# Patient Record
Sex: FEMALE | Race: WHITE | URBAN - METROPOLITAN AREA
[De-identification: names, ages, dates, MRNs, and addresses within clinical notes are randomized per-mention and may not be internally consistent; named-entity substitution may affect disease eponyms.]

---

## 2024-06-05 ENCOUNTER — OFFICE VISIT (OUTPATIENT)
Dept: INTEGRATIVE MEDICINE | Facility: CLINIC | Age: 39
End: 2024-06-05
Payer: COMMERCIAL

## 2024-06-05 VITALS
OXYGEN SATURATION: 98 % | WEIGHT: 190.38 LBS | HEIGHT: 64 IN | DIASTOLIC BLOOD PRESSURE: 70 MMHG | BODY MASS INDEX: 32.5 KG/M2 | HEART RATE: 70 BPM | SYSTOLIC BLOOD PRESSURE: 110 MMHG

## 2024-06-05 DIAGNOSIS — R53.83 OTHER FATIGUE: Primary | ICD-10-CM

## 2024-06-05 DIAGNOSIS — N92.0 MENORRHAGIA WITH REGULAR CYCLE: ICD-10-CM

## 2024-06-05 DIAGNOSIS — R63.5 WEIGHT GAIN: ICD-10-CM

## 2024-06-05 PROCEDURE — 3078F DIAST BP <80 MM HG: CPT | Performed by: PHYSICIAN ASSISTANT

## 2024-06-05 PROCEDURE — G2211 COMPLEX E/M VISIT ADD ON: HCPCS | Performed by: PHYSICIAN ASSISTANT

## 2024-06-05 PROCEDURE — 99204 OFFICE O/P NEW MOD 45 MIN: CPT | Performed by: PHYSICIAN ASSISTANT

## 2024-06-05 PROCEDURE — 3074F SYST BP LT 130 MM HG: CPT | Performed by: PHYSICIAN ASSISTANT

## 2024-06-05 PROCEDURE — 3008F BODY MASS INDEX DOCD: CPT | Performed by: PHYSICIAN ASSISTANT

## 2024-06-05 RX ORDER — FLUOXETINE 10 MG/1
10 CAPSULE ORAL DAILY
COMMUNITY
Start: 2024-01-16

## 2024-06-05 RX ORDER — LORAZEPAM 0.5 MG/1
0.5 TABLET ORAL 2 TIMES DAILY PRN
COMMUNITY
Start: 2024-03-05

## 2024-06-05 NOTE — PROGRESS NOTES
Diana Kapadia is a 38 year old female.  Chief Complaint   Patient presents with   • Establish Care     Patient presents to establish care. Post partum health.        HPI:   Diana presents for initial evaluation,     Main concern:   She is 9 months post partum. She is feeling like her health could be better. She is struggling to lose weight. She has gotten back to exercise and nothing is coming off.   She is still feeding still.   She is feeling achier     Thyroid: NO known thyroid issues.     Body/rash:   Brain fog around cycle  She has achiness all the time. No achiness before she was pregnant. Yesterday she ran a mile and a half and she did not feel right.     Hormones - She was diagnosed with endometriosis 31 or 32.   She feels like she has brain fog especially around her cycle.   She has a history of endometriosis. They discovered it when she was looking into fertility preservation and found endometrial     Painful periods. She feels this was an issue as long as she can remember    She started her period at age 9   She started birth control in college. She went off it in her 20s. BC affected her mood.     She proactively went to acupuncture to help with fertility.   No miscarriages     She has regular cycle. It is about once a month it is less than 30 days     GI - She has regular bowel she will have 1 a day. Occasionally throughout the day.   She feels bloating. She feels this is something she has struggled with for a while     Mental state -   She has struggled with anxiety.   She was proactive post partum seeing a therapist.   She is more down the week before her cycle     Weight History:   She has felt like in the past.     Before puberty she was a little chubby she was never heavier.   She did not struggle with weight in high school and college.   She was always size 4-6 high school and college. She was very active.     Late 20s early 30s she felt like that was a concern that she could not lose  the weight in her stomach. She had a concern because her grandmother had a tumor that was ovarian cancer     She has only lost her daughters weight     Childhood -   Trauma: no childhood trauma, no other trauma   Antibiotic use  She was on antibiotics for ear infections. She had tubes twice. She started becoming immune to different antibiotics.         Lifestyle Factors affecting health:   Diet - She sometimes feels she is all over the place with eating.   Morning she will wake up and have a RX bar - clean protein bar     Mid morning - toast, egg, spinach    Lunch salad with chicken sometimes cottage cheese. She is eating whole foods throughout the day. She grazes more.     Diner - 530 turkey nachos on peppers   [place] with greens     After her daughter goes to sleep she will snack with peanuts, white chocolate chips and pretzels     Exercise -she has been going to a strength pilates once or twice a week.      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 She is trying to figure out her next career move. She feels her stress has manifested into anxiety     Sleep - Daughter does not sleep through the night. She will wake with her daughter once a night.   She can fall asleep and stay asleep     Supplements:   Was on a prenatal   ALLERGIES   No Known Allergies     CURRENT MEDICATIONS:     Current Outpatient Medications   Medication Sig Dispense Refill   • FLUoxetine 10 MG Oral Cap Take 1 capsule (10 mg total) by mouth daily.     • LORazepam 0.5 MG Oral Tab Take 1 tablet (0.5 mg total) by mouth 2 (two) times daily as needed for Anxiety.         MEDICAL HISTORY:     Past Medical History:   • Anxiety   • Endometriosis       SURGICAL HISTORY:   History reviewed. No pertinent surgical history.    FAMILY HISTORY:      Family History   Problem Relation Age of Onset   • Cancer Father    • Cancer Maternal Grandmother        SOCIAL HISTORY:     Social History     Tobacco Use   • Smoking status: Never   •  Smokeless tobacco: Never   Vaping Use   • Vaping status: Never Used   Substance and Sexual Activity   • Alcohol use: Yes   • Drug use: Never       REVIEW OF SYSTEMS:   Review of Systems     See HPI for pertinent positives and negatives     PHYSICAL EXAM:     Vitals:    06/05/24 1341   BP: 110/70   BP Location: Right arm   Patient Position: Sitting   Cuff Size: adult   Pulse: 70   SpO2: 98%   Weight: 190 lb 6.4 oz (86.4 kg)   Height: 5' 4\" (1.626 m)       Physical Exam     Physical Exam  Constitutional:       Appearance: Normal appearance.   Neurological:      General: No focal deficit present.      Mental Status: She is alert and oriented to person, place, and time.   Psychiatric:         Mood and Affect: Mood normal.    ASSESSMENT AND PLAN:     Full hormone testing will be evaluated based on labs recommendations will be made from a hormonal standpoint    I am concerned there is a leaky gut component. We will start with probiotic and possible digestive enzyme based on stomach acid test     Future discussion on pro inflammatory triggers    Discussed stress management techniques briefly     1. Other fatigue  - Free T3 (Triiodothryronine); Future  - Reverse T3, Serum; Future  - Thyroid Antithyroglobulin AB; Future  - Thyroid Peroxidase (TPO) AB; Future  - Free T4, (Free Thyroxine); Future  - Assay, Thyroid Stim Hormone; Future  - Comp Metabolic Panel (14); Future  - CBC With Differential With Platelet; Future  - Vitamin D; Future  - Dehydroepiandrosterone Sulfate; Future  - Estradiol; Future  - Estrone, Serum; Future  - FSH; Future  - LH Fertility; Future  - Testosterone,Total and Weakly Bound w/ SHBG; Future  - Progesterone; Future  - Insulin; Future  - Leptin, Serum; Future    2. Weight gain  - Free T3 (Triiodothryronine); Future  - Reverse T3, Serum; Future  - Thyroid Antithyroglobulin AB; Future  - Thyroid Peroxidase (TPO) AB; Future  - Free T4, (Free Thyroxine); Future  - Assay, Thyroid Stim Hormone; Future  -  Insulin; Future  - Leptin, Serum; Future    3. Menorrhagia with regular cycle  - Free T3 (Triiodothryronine); Future  - Reverse T3, Serum; Future  - Thyroid Antithyroglobulin AB; Future  - Thyroid Peroxidase (TPO) AB; Future  - Free T4, (Free Thyroxine); Future  - Assay, Thyroid Stim Hormone; Future  - Comp Metabolic Panel (14); Future  - CBC With Differential With Platelet; Future  - Vitamin D; Future  - Dehydroepiandrosterone Sulfate; Future  - Estradiol; Future  - Estrone, Serum; Future  - FSH; Future  - LH Fertility; Future  - Testosterone,Total and Weakly Bound w/ SHBG; Future  - Progesterone; Future  - Insulin; Future  - Leptin, Serum; Future      Time spent with patient: Over 45 minutes spent in chart review and in direct communication with patient obtaining and reviewing history, creating a unique care plan, explaining the rationale for treatment, reviewing potential SE and overall treatment plan,  documenting all clinical information in Epic. Over 50% of this time was in education, counseling and coordination of care.     Problem List Items Addressed This Visit    None  Visit Diagnoses       Other fatigue    -  Primary    Relevant Orders    Free T3 (Triiodothryronine)    Reverse T3, Serum    Thyroid Antithyroglobulin AB    Thyroid Peroxidase (TPO) AB    Free T4, (Free Thyroxine)    Assay, Thyroid Stim Hormone    Comp Metabolic Panel (14)    CBC With Differential With Platelet    Vitamin D    Dehydroepiandrosterone Sulfate    Estradiol    Estrone, Serum    FSH    LH Fertility    Testosterone,Total and Weakly Bound w/ SHBG    Progesterone    Insulin    Leptin, Serum    Weight gain        Relevant Orders    Free T3 (Triiodothryronine)    Reverse T3, Serum    Thyroid Antithyroglobulin AB    Thyroid Peroxidase (TPO) AB    Free T4, (Free Thyroxine)    Assay, Thyroid Stim Hormone    Insulin    Leptin, Serum    Menorrhagia with regular cycle        Relevant Orders    Free T3 (Triiodothryronine)    Reverse T3, Serum     Thyroid Antithyroglobulin AB    Thyroid Peroxidase (TPO) AB    Free T4, (Free Thyroxine)    Assay, Thyroid Stim Hormone    Comp Metabolic Panel (14)    CBC With Differential With Platelet    Vitamin D    Dehydroepiandrosterone Sulfate    Estradiol    Estrone, Serum    FSH    LH Fertility    Testosterone,Total and Weakly Bound w/ SHBG    Progesterone    Insulin    Leptin, Serum             Orders Placed This Visit:  Orders Placed This Encounter   Procedures   • Free T3 (Triiodothryronine)   • Reverse T3, Serum   • Thyroid Antithyroglobulin AB   • Thyroid Peroxidase (TPO) AB   • Free T4, (Free Thyroxine)   • Assay, Thyroid Stim Hormone   • Comp Metabolic Panel (14)   • CBC With Differential With Platelet   • Vitamin D   • Dehydroepiandrosterone Sulfate   • Estradiol   • Estrone, Serum   • FSH   • LH Fertility   • Testosterone,Total and Weakly Bound w/ SHBG   • Progesterone   • Insulin   • Leptin, Serum     No orders of the defined types were placed in this encounter.      Patient Instructions   Day one is first day of bleeding   Blood draw on day 19-21   Fasting blood draw     Stomach acid test    First thing in the morning.  1/4 tsp baking soda + 4 ounces of water.  You should burp within 3 minutes if you do not burp it means low stomach acid.     If you do not burp I would incorporate the below digestive enzyme     The following website can be utilized to purchase supplements.     [email or web address]           One capsule with every meal      Probiotic G.I. is a shelf-stable probiotic blend to support healthy immune function within the G.I. tract. Probiotic G.I. promotes gut associated lymphoid tissue composition and function to support healthy barrier function.*    Promotes G.I. health*  Helps maintain healthy cellular activity in the G.I. tract*  Made with hypoallergenic, vegan ingredients*  Shelf-stable probiotic blend to support a healthy immune function within the G.I.  tract*  Probiotic G.I. provides 10 billion CFU per capsule of the beneficial bacteria Lactobacillus acidophilus, Lactobacillus salivarius, Lactobacillus casei, Bifidobacterium bifidum, Bifidobacterium lactis and Streptococcus thermophilus. Research indicates that these strains provide particular support for healthy immune balance within the G.I. tract. Beneficial microflora are crucial for proper gut associated lymphoid tissue (GALT) function and development. The GALT helps protect intestinal mucosa from allergens and toxins and is a major component of the body.           Suggested Use:  Take 1 capsule, 1 times daily, with or between meals.       Serving Size: One Vegetarian Capsule       Amount Per Serving  Probiotic Blend ... 10 Billion CFU  Providing:  Bifidobacterium lactis (Bl-04)  Lactobacillus acidophilus (La-14)  Lactobacillus salivarius (Ls-33)  Lactobacillus casei (Lc-11)  Streptococcus thermophilus (St-21)  Bifidobacterium bifidum (Bb-06)       Other Ingredients: hypoallergenic plant fiber (cellulose), vegetarian capsule (cellulose, water).     Try to eat 3 meals a day and decrease snacking and grazing   Protein with three meals     Keep Net carbs low - Net Carb = Total carb - fiber   Net carb goal <30g per meal    Fruit Carbohydrates per serving Fiber per serving Net carbs   Grapes (1Cup/151g)  26g 0.8g 25.2g   Banana (1 medium) 24g 3.1g 21g   Pear (1 medium) 22g 6g 16g   Apple (1 medium) 21g 4.4g 16.6g         Pineapple (1Cup/165g) 29g 2.3g 26.7   Blueberries (1Cup/148g) 17g 4g 13g   Cantaloupe (1Cup/177g) 14g 1.6g 12.4g   Oranges (1 medium) 12g 2.3g 9.7g   Watermelon (1Cup/154g) 12g 0.6g 11.4g   Peaches (1 Large) 9.5g 3g 6.5   Kiwi (1 medium) 9g 2.1g 6.9   Avocado (half of one) 9.5g 4.6g 4.9g   Strawberries (1Cup/144g) 8g 3 5g   Lime (1 fruit) 7g 1.9g 5.1g   Lemon (1 fruit) 6g 1.6g 4.4g   Tomatoes (1 fruit) 3.2g  1.5g 1.7g   Vegetables Carbohydrates per serving  Fiber per serving  Net carbs per serving     Asparagus (5 vora/93.5g) 4g 1.5g 2.5g   Bell Pepper (1 medium) 6g 2g 4g   Broccoli (1 medium stalk) 8g 6g 2g   Carrot (1 carrot 7.5inches long) 7g 1.7g 5.3g   Cauliflower (99g)  5g 2g 3g   Celery (2 medium stalks) 4g 2g 2g   Cucumber (? medium) 2g 1g 1g   Green Beans (3/4C)  5g 3g 2g   Green Cabbage (84g) 5g 2g 3g   Green Onion (1/4C chopped) 2g 1g 1g   Iceberg lettuce (89g)  2g 1.1g 0.9g   Jalapeno (1C sliced 6g 2.5g 3.5g   Leaf lettuce (1/2C shredded) 2g 1g 1.g   Mushrooms (5 medium) 3g 1g 2g   Onion (1 medium) 11g 1.9g 9g   Potatoe (1 medium/148g) 26g 2g 24g   Radishes (7 radishes) 3g 0.7g 2.3g   Summer Squash (½ medium) 4g 1.1g 2.9g   Sweet corn (1 medium ear) 18g 2.4g 15.6   Sweet Potato (1 medium) 23g 4g 19g     Proteins I recommend    Powders   Tone it up  Isopure  Optimum nutrition     Pre made   Slim Fast     In the future we may look into decreasing pro inflammatory foods such as gluten        Return in about 6 weeks (around 7/17/2024) for 60.    Patient affirmed understanding of plan and all questions were answered.     Alissa Jeffrey PA-C

## 2024-06-05 NOTE — PATIENT INSTRUCTIONS
Day one is first day of bleeding   Blood draw on day 19-21   Fasting blood draw     Stomach acid test    First thing in the morning.  1/4 tsp baking soda + 4 ounces of water.  You should burp within 3 minutes if you do not burp it means low stomach acid.     If you do not burp I would incorporate the below digestive enzyme     The following website can be utilized to purchase supplements.     [email or web address]           One capsule with every meal      Probiotic G.I. is a shelf-stable probiotic blend to support healthy immune function within the G.I. tract. Probiotic G.I. promotes gut associated lymphoid tissue composition and function to support healthy barrier function.*    Promotes G.I. health*  Helps maintain healthy cellular activity in the G.I. tract*  Made with hypoallergenic, vegan ingredients*  Shelf-stable probiotic blend to support a healthy immune function within the G.I. tract*  Probiotic G.I. provides 10 billion CFU per capsule of the beneficial bacteria Lactobacillus acidophilus, Lactobacillus salivarius, Lactobacillus casei, Bifidobacterium bifidum, Bifidobacterium lactis and Streptococcus thermophilus. Research indicates that these strains provide particular support for healthy immune balance within the G.I. tract. Beneficial microflora are crucial for proper gut associated lymphoid tissue (GALT) function and development. The GALT helps protect intestinal mucosa from allergens and toxins and is a major component of the body.           Suggested Use:  Take 1 capsule, 1 times daily, with or between meals.       Serving Size: One Vegetarian Capsule       Amount Per Serving  Probiotic Blend ... 10 Billion CFU  Providing:  Bifidobacterium lactis (Bl-04)  Lactobacillus acidophilus (La-14)  Lactobacillus salivarius (Ls-33)  Lactobacillus casei (Lc-11)  Streptococcus thermophilus (St-21)  Bifidobacterium bifidum (Bb-06)       Other Ingredients: hypoallergenic plant fiber  (cellulose), vegetarian capsule (cellulose, water).     Try to eat 3 meals a day and decrease snacking and grazing   Protein with three meals     Keep Net carbs low - Net Carb = Total carb - fiber   Net carb goal <30g per meal    Fruit Carbohydrates per serving Fiber per serving Net carbs   Grapes (1Cup/151g)  26g 0.8g 25.2g   Banana (1 medium) 24g 3.1g 21g   Pear (1 medium) 22g 6g 16g   Apple (1 medium) 21g 4.4g 16.6g         Pineapple (1Cup/165g) 29g 2.3g 26.7   Blueberries (1Cup/148g) 17g 4g 13g   Cantaloupe (1Cup/177g) 14g 1.6g 12.4g   Oranges (1 medium) 12g 2.3g 9.7g   Watermelon (1Cup/154g) 12g 0.6g 11.4g   Peaches (1 Large) 9.5g 3g 6.5   Kiwi (1 medium) 9g 2.1g 6.9   Avocado (half of one) 9.5g 4.6g 4.9g   Strawberries (1Cup/144g) 8g 3 5g   Lime (1 fruit) 7g 1.9g 5.1g   Lemon (1 fruit) 6g 1.6g 4.4g   Tomatoes (1 fruit) 3.2g  1.5g 1.7g   Vegetables Carbohydrates per serving  Fiber per serving  Net carbs per serving    Asparagus (5 vora/93.5g) 4g 1.5g 2.5g   Bell Pepper (1 medium) 6g 2g 4g   Broccoli (1 medium stalk) 8g 6g 2g   Carrot (1 carrot 7.5inches long) 7g 1.7g 5.3g   Cauliflower (99g)  5g 2g 3g   Celery (2 medium stalks) 4g 2g 2g   Cucumber (? medium) 2g 1g 1g   Green Beans (3/4C)  5g 3g 2g   Green Cabbage (84g) 5g 2g 3g   Green Onion (1/4C chopped) 2g 1g 1g   Iceberg lettuce (89g)  2g 1.1g 0.9g   Jalapeno (1C sliced 6g 2.5g 3.5g   Leaf lettuce (1/2C shredded) 2g 1g 1.g   Mushrooms (5 medium) 3g 1g 2g   Onion (1 medium) 11g 1.9g 9g   Potatoe (1 medium/148g) 26g 2g 24g   Radishes (7 radishes) 3g 0.7g 2.3g   Summer Squash (½ medium) 4g 1.1g 2.9g   Sweet corn (1 medium ear) 18g 2.4g 15.6   Sweet Potato (1 medium) 23g 4g 19g     Proteins I recommend    Powders   Tone it up  Isopure  Optimum nutrition     Pre made   Slim Fast     In the future we may look into decreasing pro inflammatory foods such as gluten

## 2024-06-25 ENCOUNTER — LAB ENCOUNTER (OUTPATIENT)
Dept: LAB | Age: 39
End: 2024-06-25
Attending: PHYSICIAN ASSISTANT

## 2024-06-25 DIAGNOSIS — N92.0 MENORRHAGIA WITH REGULAR CYCLE: ICD-10-CM

## 2024-06-25 DIAGNOSIS — R63.5 WEIGHT GAIN: ICD-10-CM

## 2024-06-25 DIAGNOSIS — R53.83 OTHER FATIGUE: ICD-10-CM

## 2024-06-25 LAB
ALBUMIN SERPL-MCNC: 4.2 G/DL (ref 3.2–4.8)
ALBUMIN/GLOB SERPL: 1.6 {RATIO} (ref 1–2)
ALP LIVER SERPL-CCNC: 95 U/L
ALT SERPL-CCNC: 19 U/L
ANION GAP SERPL CALC-SCNC: 8 MMOL/L (ref 0–18)
AST SERPL-CCNC: 17 U/L (ref ?–34)
BASOPHILS # BLD AUTO: 0.07 X10(3) UL (ref 0–0.2)
BASOPHILS NFR BLD AUTO: 0.9 %
BILIRUB SERPL-MCNC: 0.3 MG/DL (ref 0.3–1.2)
BUN BLD-MCNC: 14 MG/DL (ref 9–23)
BUN/CREAT SERPL: 23 (ref 10–20)
CALCIUM BLD-MCNC: 9.1 MG/DL (ref 8.7–10.4)
CHLORIDE SERPL-SCNC: 111 MMOL/L (ref 98–112)
CO2 SERPL-SCNC: 20 MMOL/L (ref 21–32)
CREAT BLD-MCNC: 0.61 MG/DL
DEPRECATED RDW RBC AUTO: 40.6 FL (ref 35.1–46.3)
DHEA-S SERPL-MCNC: 73 UG/DL
EGFRCR SERPLBLD CKD-EPI 2021: 117 ML/MIN/1.73M2 (ref 60–?)
EOSINOPHIL # BLD AUTO: 0.13 X10(3) UL (ref 0–0.7)
EOSINOPHIL NFR BLD AUTO: 1.7 %
ERYTHROCYTE [DISTWIDTH] IN BLOOD BY AUTOMATED COUNT: 13.2 % (ref 11–15)
ESTRADIOL SERPL-MCNC: 100.3 PG/ML
FASTING STATUS PATIENT QL REPORTED: YES
FSH SERPL-ACNC: 3.9 MIU/ML
GLOBULIN PLAS-MCNC: 2.7 G/DL (ref 2–3.5)
GLUCOSE BLD-MCNC: 104 MG/DL (ref 70–99)
HCT VFR BLD AUTO: 40.1 %
HGB BLD-MCNC: 13.7 G/DL
IMM GRANULOCYTES # BLD AUTO: 0.02 X10(3) UL (ref 0–1)
IMM GRANULOCYTES NFR BLD: 0.3 %
INSULIN SERPL-ACNC: 7.4 MU/L (ref 3–25)
LH SERPL-ACNC: 5.3 MIU/ML
LYMPHOCYTES # BLD AUTO: 2.48 X10(3) UL (ref 1–4)
LYMPHOCYTES NFR BLD AUTO: 33.2 %
MCH RBC QN AUTO: 28.8 PG (ref 26–34)
MCHC RBC AUTO-ENTMCNC: 34.2 G/DL (ref 31–37)
MCV RBC AUTO: 84.2 FL
MONOCYTES # BLD AUTO: 0.46 X10(3) UL (ref 0.1–1)
MONOCYTES NFR BLD AUTO: 6.1 %
NEUTROPHILS # BLD AUTO: 4.32 X10 (3) UL (ref 1.5–7.7)
NEUTROPHILS # BLD AUTO: 4.32 X10(3) UL (ref 1.5–7.7)
NEUTROPHILS NFR BLD AUTO: 57.8 %
OSMOLALITY SERPL CALC.SUM OF ELEC: 289 MOSM/KG (ref 275–295)
PLATELET # BLD AUTO: 273 10(3)UL (ref 150–450)
POTASSIUM SERPL-SCNC: 4.3 MMOL/L (ref 3.5–5.1)
PROGEST SERPL-MCNC: 13.18 NG/ML
PROT SERPL-MCNC: 6.9 G/DL (ref 5.7–8.2)
RBC # BLD AUTO: 4.76 X10(6)UL
SODIUM SERPL-SCNC: 139 MMOL/L (ref 136–145)
T3FREE SERPL-MCNC: 2.91 PG/ML (ref 2.4–4.2)
T4 FREE SERPL-MCNC: 0.8 NG/DL (ref 0.8–1.7)
THYROGLOB SERPL-MCNC: <15 U/ML (ref ?–60)
THYROPEROXIDASE AB SERPL-ACNC: <28 U/ML (ref ?–60)
TSI SER-ACNC: 0.97 MIU/ML (ref 0.55–4.78)
VIT D+METAB SERPL-MCNC: 26.6 NG/ML (ref 30–100)
WBC # BLD AUTO: 7.5 X10(3) UL (ref 4–11)

## 2024-06-25 PROCEDURE — 83520 IMMUNOASSAY QUANT NOS NONAB: CPT | Performed by: PHYSICIAN ASSISTANT

## 2024-06-25 PROCEDURE — 84481 FREE ASSAY (FT-3): CPT | Performed by: PHYSICIAN ASSISTANT

## 2024-06-25 PROCEDURE — 80050 GENERAL HEALTH PANEL: CPT | Performed by: PHYSICIAN ASSISTANT

## 2024-06-25 PROCEDURE — 83525 ASSAY OF INSULIN: CPT | Performed by: PHYSICIAN ASSISTANT

## 2024-06-25 PROCEDURE — 83001 ASSAY OF GONADOTROPIN (FSH): CPT | Performed by: PHYSICIAN ASSISTANT

## 2024-06-25 PROCEDURE — 84439 ASSAY OF FREE THYROXINE: CPT | Performed by: PHYSICIAN ASSISTANT

## 2024-06-25 PROCEDURE — 82670 ASSAY OF TOTAL ESTRADIOL: CPT | Performed by: PHYSICIAN ASSISTANT

## 2024-06-25 PROCEDURE — 84482 T3 REVERSE: CPT | Performed by: PHYSICIAN ASSISTANT

## 2024-06-25 PROCEDURE — 86800 THYROGLOBULIN ANTIBODY: CPT | Performed by: PHYSICIAN ASSISTANT

## 2024-06-25 PROCEDURE — 84410 TESTOSTERONE BIOAVAILABLE: CPT | Performed by: PHYSICIAN ASSISTANT

## 2024-06-25 PROCEDURE — 86376 MICROSOMAL ANTIBODY EACH: CPT | Performed by: PHYSICIAN ASSISTANT

## 2024-06-25 PROCEDURE — 82679 ASSAY OF ESTRONE: CPT | Performed by: PHYSICIAN ASSISTANT

## 2024-06-25 PROCEDURE — 82306 VITAMIN D 25 HYDROXY: CPT | Performed by: PHYSICIAN ASSISTANT

## 2024-06-25 PROCEDURE — 83002 ASSAY OF GONADOTROPIN (LH): CPT | Performed by: PHYSICIAN ASSISTANT

## 2024-06-25 PROCEDURE — 82627 DEHYDROEPIANDROSTERONE: CPT | Performed by: PHYSICIAN ASSISTANT

## 2024-06-25 PROCEDURE — 84144 ASSAY OF PROGESTERONE: CPT | Performed by: PHYSICIAN ASSISTANT

## 2024-06-27 LAB
ESTRONE: 56 PG/ML
LEPTIN: 56.1 NG/ML

## 2024-06-28 LAB
REVERSE T3: 8.6 NG/DL
SEX HORM BIND GLOB: 39.7 NMOL/L
TESTOST % FREE+WEAK BND: 13.7 %
TESTOST FREE+WEAK BND: 1.6 NG/DL
TESTOSTERONE TOT /MS: 11.4 NG/DL

## 2024-07-17 ENCOUNTER — OFFICE VISIT (OUTPATIENT)
Dept: INTEGRATIVE MEDICINE | Facility: CLINIC | Age: 39
End: 2024-07-17
Payer: COMMERCIAL

## 2024-07-17 VITALS
OXYGEN SATURATION: 97 % | HEART RATE: 57 BPM | BODY MASS INDEX: 32.89 KG/M2 | DIASTOLIC BLOOD PRESSURE: 70 MMHG | HEIGHT: 64 IN | SYSTOLIC BLOOD PRESSURE: 100 MMHG | WEIGHT: 192.63 LBS

## 2024-07-17 DIAGNOSIS — R06.83 SNORING: ICD-10-CM

## 2024-07-17 DIAGNOSIS — K21.9 GASTROESOPHAGEAL REFLUX DISEASE, UNSPECIFIED WHETHER ESOPHAGITIS PRESENT: ICD-10-CM

## 2024-07-17 DIAGNOSIS — R79.89 LOW VITAMIN D LEVEL: ICD-10-CM

## 2024-07-17 DIAGNOSIS — R53.83 OTHER FATIGUE: ICD-10-CM

## 2024-07-17 DIAGNOSIS — R63.5 WEIGHT GAIN: ICD-10-CM

## 2024-07-17 DIAGNOSIS — R79.89 ABNORMAL THYROID BLOOD TEST: ICD-10-CM

## 2024-07-17 DIAGNOSIS — N92.0 MENORRHAGIA WITH REGULAR CYCLE: Primary | ICD-10-CM

## 2024-07-17 PROCEDURE — 3074F SYST BP LT 130 MM HG: CPT | Performed by: PHYSICIAN ASSISTANT

## 2024-07-17 PROCEDURE — 3078F DIAST BP <80 MM HG: CPT | Performed by: PHYSICIAN ASSISTANT

## 2024-07-17 PROCEDURE — 99215 OFFICE O/P EST HI 40 MIN: CPT | Performed by: PHYSICIAN ASSISTANT

## 2024-07-17 PROCEDURE — 3008F BODY MASS INDEX DOCD: CPT | Performed by: PHYSICIAN ASSISTANT

## 2024-07-17 PROCEDURE — G2211 COMPLEX E/M VISIT ADD ON: HCPCS | Performed by: PHYSICIAN ASSISTANT

## 2024-07-17 RX ORDER — FLUOXETINE HYDROCHLORIDE 20 MG/1
20 CAPSULE ORAL DAILY
COMMUNITY
Start: 2024-06-17

## 2024-07-17 NOTE — PATIENT INSTRUCTIONS
Continue high protein  Limit anything processed  Stick to whole foods   Be kind to yourself and make small manageable lifestyle changes      Get fasting blood work prior to next appointment     Supplement recommendations:  Vitamin K2 with D3 (60 capsules) ([employer]): Please take  1 capsule, once / day.   Ortho Biotic (60 capsules) ([employer]): Please take  1 capsule, once / day.   PMS Support (60 capsules) ([employer]): Please take  2 capsules, once / day.   Pylori-X (120 capsules) ([employer]): Please take  2 capsules, twice / day (with bigger meals )    You can restart prenatal     Primary Care Providers     Roopa Oliva NP  [address]   [place], [place]  [phone]    Dr. Vu   [address]   [place]  [phone]    Dr. Miya Gorman   [address] [place] [address] [address]   [place], [place]    
12-Jan-2022

## 2024-07-17 NOTE — PROGRESS NOTES
Diana Kapadia is a 39 year old female.  Chief Complaint   Patient presents with    Follow - Up       HPI:   Diana presents for follow up,     Updates from last visit:   Since we spoke last she has been more mindful of protein intake. She does feel a little better. The number on the scale is not showing that.     Thyroid: sub optimal     Mental State:   She is less irritable when she is able to get a work out in before her daughter is up.   She feels more anxious when she is foggy.     Hormones -   Brain fog very much there.     GI -   She has been paying attention to her bowels. She is not regular. She is having bowels daily. But sometimes it is late others it is early. She has a sense of urgency. She is having soft stools daily .  She is waking up with her chest discomfort and she is snoring more. She is a lot more congested and itchy     Weight - no changes in weight       Lifestyle Factors affecting health:   Diet - Incorporating more protein     Exercise -  She is doing strength training. She goes to a class that is barre, pilates and yoga. She feels exercise helps her achy and more clear headed.      Stress - stress is the same \. She is trying to see if she should go back to work full time. Parenting is feeling a lot  better     Sleep - She is getting more full nights of sleep. She is still waking up super tired.     Supplements:   She got the isopure protein. She has been having protein shake 4 times a week. The probiotic she has not done yet.       HPI's FROM PREVIOUS VISITS      Diana presents for initial evaluation,     Main concern:   She is 9 months post partum. She is feeling like her health could be better. She is struggling to lose weight. She has gotten back to exercise and nothing is coming off.   She is still feeding still.   She is feeling achier     Thyroid: NO known thyroid issues.     Body/rash:   Brain fog around cycle  She has achiness all the time. No achiness before she was  pregnant. Yesterday she ran a mile and a half and she did not feel right.     Hormones - She was diagnosed with endometriosis 31 or 32.   She feels like she has brain fog especially around her cycle.   She has a history of endometriosis. They discovered it when she was looking into fertility preservation and found endometrial     Painful periods. She feels this was an issue as long as she can remember    She started her period at age 9   She started birth control in college. She went off it in her 20s. BC affected her mood.     She proactively went to acupuncture to help with fertility.   No miscarriages     She has regular cycle. It is about once a month it is less than 30 days     GI - She has regular bowel she will have 1 a day. Occasionally throughout the day.   She feels bloating. She feels this is something she has struggled with for a while     Mental state -   She has struggled with anxiety.   She was proactive post partum seeing a therapist.   She is more down the week before her cycle     Weight History:   She has felt like in the past.     Before puberty she was a little chubby she was never heavier.   She did not struggle with weight in high school and college.   She was always size 4-6 high school and college. She was very active.     Late 20s early 30s she felt like that was a concern that she could not lose the weight in her stomach. She had a concern because her grandmother had a tumor that was ovarian cancer     She has only lost her daughters weight     Childhood -   Trauma: no childhood trauma, no other trauma   Antibiotic use  She was on antibiotics for ear infections. She had tubes twice. She started becoming immune to different antibiotics.         Lifestyle Factors affecting health:   Diet - She sometimes feels she is all over the place with eating.   Morning she will wake up and have a RX bar - clean protein bar     Mid morning - toast, egg, spinach    Lunch salad with chicken sometimes  cottage cheese. She is eating whole foods throughout the day. She grazes more.     Diner - 530 turkey nachos on peppers   [place] with greens     After her daughter goes to sleep she will snack with peanuts, white chocolate chips and pretzels     Exercise -she has been going to a [facility] [employer] once or twice a week.      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 She is trying to figure out her next career move. She feels her stress has manifested into anxiety     Sleep - Daughter does not sleep through the night. She will wake with her daughter once a night.   She can fall asleep and stay asleep     Supplements:   Was on a prenatal   ALLERGIES   No Known Allergies     CURRENT MEDICATIONS:     Current Outpatient Medications   Medication Sig Dispense Refill    FLUoxetine 20 MG Oral Cap Take 1 capsule (20 mg total) by mouth daily.      LORazepam 0.5 MG Oral Tab Take 1 tablet (0.5 mg total) by mouth 2 (two) times daily as needed for Anxiety.      FLUoxetine 10 MG Oral Cap Take 1 capsule (10 mg total) by mouth daily. (Patient not taking: Reported on 7/17/2024)         MEDICAL HISTORY:     Past Medical History:    Anxiety    Endometriosis       SURGICAL HISTORY:   History reviewed. No pertinent surgical history.    FAMILY HISTORY:      Family History   Problem Relation Age of Onset    Cancer Father     Cancer Maternal Grandmother        SOCIAL HISTORY:     Social History     Socioeconomic History    Marital status: Single   Tobacco Use    Smoking status: Never    Smokeless tobacco: Never   Vaping Use    Vaping status: Never Used   Substance and Sexual Activity    Alcohol use: Yes    Drug use: Never       REVIEW OF SYSTEMS:   Review of Systems     See HPI for pertinent positives and negatives     PHYSICAL EXAM:     Vitals:    07/17/24 1057   BP: 100/70   BP Location: Right arm   Patient Position: Sitting   Cuff Size: adult   Pulse: 57   SpO2: 97%   Weight: 192 lb 9.6 oz (87.4 kg)   Height: 5' 4\"  (1.626 m)       Physical Exam  Constitutional:       Appearance: Normal appearance.   HENT:      Right Ear: Ear canal and external ear normal.      Left Ear: Ear canal and external ear normal.      Ears:      Comments: Mild serous fluid behind bilateral TM      Mouth/Throat:      Pharynx: No oropharyngeal exudate or posterior oropharyngeal erythema.   Cardiovascular:      Heart sounds: Normal heart sounds.   Pulmonary:      Breath sounds: Normal breath sounds.   Abdominal:      General: Bowel sounds are increased.      Palpations: Abdomen is soft.      Tenderness: There is abdominal tenderness in the epigastric area.   Neurological:      Mental Status: She is alert.       Neurological:      General: No focal deficit present.      Mental Status: She is alert and oriented to person, place, and time.   Psychiatric:         Mood and Affect: Mood normal.    ASSESSMENT AND PLAN:     Patient is here for a follow-up visit we reviewed her labs.  Labs show that she is low in vitamin D and she is to start vitamin D supplementation at 5000 units.    Her T3 and T4 were suboptimal we will continue to monitor this as we make changes and increasing her protein increasing whole foods and decreasing processed foods.  Had conversation about treating herself after her daughter goes to bed.  We discussed how we may need to reframe what is considered as a treat and or decrease what she eats for portion control or making choices such as nuts that are higher in fiber.    Hormone levels looked fairly stable but she continues to have significantly painful cycles and brain fog and irritability that cycles with her menses.  We will use PMS support for this.    Patient has reported that she has some chest discomfort that she wakes up with and then resolves that she is having some nasal congestion and ear congestion.  I do believe that this is due to GERD and I am recommending her use PylorX.  I made this choice due to the supplements that will  help calm the stomach and gut lining but I also like that it contains berberine to help with any insulin resistance.        1. Low vitamin D level  - Vitamin D; Future    2. Abnormal thyroid blood test  - Free T3 (Triiodothryronine); Future  - Reverse T3, Serum; Future  - Free T4, (Free Thyroxine); Future  - Assay, Thyroid Stim Hormone; Future    3. Weight gain  - CBC With Differential With Platelet; Future  - Comp Metabolic Panel (14); Future    4. Menorrhagia with regular cycle  - CBC With Differential With Platelet; Future  - Comp Metabolic Panel (14); Future    5. Other fatigue  - CBC With Differential With Platelet; Future  - Comp Metabolic Panel (14); Future    6. Gastroesophageal reflux disease, unspecified whether esophagitis present    7. Snoring      Time spent with patient: Over 45 minutes spent in chart review and in direct communication with patient obtaining and reviewing history, creating a unique care plan, explaining the rationale for treatment, reviewing potential SE and overall treatment plan,  documenting all clinical information in Epic. Over 50% of this time was in education, counseling and coordination of care.     Problem List Items Addressed This Visit    None  Visit Diagnoses       Menorrhagia with regular cycle    -  Primary    Relevant Orders    CBC With Differential With Platelet    Comp Metabolic Panel (14)    Low vitamin D level        Relevant Orders    Vitamin D    Abnormal thyroid blood test        Relevant Orders    Free T3 (Triiodothryronine)    Reverse T3, Serum    Free T4, (Free Thyroxine)    Assay, Thyroid Stim Hormone    Weight gain        Relevant Orders    CBC With Differential With Platelet    Comp Metabolic Panel (14)    Other fatigue        Relevant Orders    CBC With Differential With Platelet    Comp Metabolic Panel (14)    Gastroesophageal reflux disease, unspecified whether esophagitis present        Snoring        Relevant Medications    FLUoxetine 20 MG Oral Cap              Orders Placed This Visit:  Orders Placed This Encounter   Procedures    Free T3 (Triiodothryronine)    Reverse T3, Serum    Free T4, (Free Thyroxine)    Assay, Thyroid Stim Hormone    Vitamin D    CBC With Differential With Platelet    Comp Metabolic Panel (14)     No orders of the defined types were placed in this encounter.      Patient Instructions   Continue high protein  Limit anything processed  Stick to whole foods   Be kind to yourself and make small manageable lifestyle changes      Get fasting blood work prior to next appointment     Supplement recommendations:  Vitamin K2 with D3 (60 capsules) ([employer]): Please take  1 capsule, once / day.   Ortho Biotic (60 capsules) ([employer]): Please take  1 capsule, once / day.   PMS Support (60 capsules) ([employer]): Please take  2 capsules, once / day.   Pylori-X (120 capsules) ([employer]): Please take  2 capsules, twice / day (with bigger meals )    You can restart prenatal     Primary Care Providers     Roopa Oliva NP  [address]   [place], [place]  [phone]    Dr. Vu   [address]   [place]  [phone]    Dr. Miya Gorman   [address] [place] [address] [id number]   [place], [place]      No follow-ups on file.    Patient affirmed understanding of plan and all questions were answered.     Alissa Jeffrey PA-C

## 2024-10-22 ENCOUNTER — TELEMEDICINE (OUTPATIENT)
Dept: INTEGRATIVE MEDICINE | Facility: CLINIC | Age: 39
End: 2024-10-22
Payer: COMMERCIAL

## 2024-10-22 DIAGNOSIS — E61.8 MICRONUTRIENTS DEFICIENCY: ICD-10-CM

## 2024-10-22 DIAGNOSIS — R79.89 LOW VITAMIN D LEVEL: Primary | ICD-10-CM

## 2024-10-22 DIAGNOSIS — R53.83 OTHER FATIGUE: ICD-10-CM

## 2024-10-22 DIAGNOSIS — R63.5 WEIGHT GAIN: ICD-10-CM

## 2024-10-22 DIAGNOSIS — R79.89 ABNORMAL THYROID BLOOD TEST: ICD-10-CM

## 2024-10-22 DIAGNOSIS — N92.0 MENORRHAGIA WITH REGULAR CYCLE: ICD-10-CM

## 2024-10-22 PROCEDURE — G2211 COMPLEX E/M VISIT ADD ON: HCPCS | Performed by: PHYSICIAN ASSISTANT

## 2024-10-22 PROCEDURE — 99215 OFFICE O/P EST HI 40 MIN: CPT | Performed by: PHYSICIAN ASSISTANT

## 2024-10-22 PROCEDURE — 99417 PROLNG OP E/M EACH 15 MIN: CPT | Performed by: PHYSICIAN ASSISTANT

## 2024-10-22 NOTE — PATIENT INSTRUCTIONS
Plan   1) Supplement to encourage and dopamine   - monitor irritability, focus, concentration   -future consideration wellbutrin, strattera, adderall       2 twice a day     Emotion Balance Support (formerly Deproloft-HF)  2) Fiber, protein and carbs   3) Try to increase good fats - lunch olive oil, avocado, nuts and seeds   High fiber, good fat at dinner   4) magnesium at bedtime - restart     ADHD resources for Women   Radical guide for women with Adhd   [email or web address].org/adhd-[email or web address]-[email or web address]   Dr Amen - 7 types ADHD    Subjective   Patient ID: Trav is a 75 year old male who presents for his annual Medicare wellness exam, and for a follow-up of chronic medical conditions.    Chief Complaint   Patient presents with   • Office Visit     Reminded patient to do the FOBT Kit given to him on 2/27/24.   • Follow-up     Labs performed on 6/21/24 or further review with patient, medication management and check up.   • Diabetes     POCT ~ A1C=6.1   performed today.   Glucose= 120 at the fasting lab draw of 6/21/24.   • Imm/Inj     Up to date on vaccinations that the patient is willing to receive.           HPI    Historian: Self       The patient presents for a follow up of chronic medical conditions.     HPI:    Screen for colon cancer:  Due.    Type 2 diabetes mellitus without complication, without long-term current use of insulin (CMD):  On diet and Metformin. Today his A1c is 6.1%.     Benign essential HTN:  On Metoprolol ER and Losartan 50 mg daily.    Hyperlipidemia, mixed:  Unable to tolerate statins. On Zetia.     Atherosclerosis of native coronary artery of native heart with angina pectoris (CMD):  On Metoprolol, Aspirin, and Isosorbide. Denies having chest pain.    Bifascicular bundle branch block:  On Metoprolol, Losartan, Imdur, and Zetia.      Statin intolerance:  Is unable to tolerate statins. On Zetia.     Venous stasis dermatitis of right lower extremity:  Has history of venous stasis dermatitis of right lower extremity.    MI, old:  Has a history of MI.    Acquired hypothyroidism:  On Levothyroxine 100 MCG tablets.    Morbid obesity with BMI of 40.0-44.9, adult (CMD):  Has BMI of 44.07    Hyperuricemia:  On Allopurinol.    Additional comments:  Has hearing loss. Today he is not using his hearing aid.    Past Medical History  Trav has a past medical history of Abscess of right foot (10/08/2019), Cataract, DM2 (diabetes mellitus, type 2)  (CMD), Encounter for annual general medical examination with abnormal findings in  adult (06/18/2019), Encounter for Medicare annual examination with abnormal findings (06/18/2019), Hyperlipidemia, Hypertension, Hypothyroidism, Low back pain (11/29/2018), and Umbilical hernia without obstruction and without gangrene (12/11/2018).      Trav has Benign essential HTN; Bifascicular bundle branch block; Hyperuricemia; Hypothyroidism; Type 2 diabetes mellitus without complication, without long-term current use of insulin  (CMD); Hyperlipidemia LDL goal <70; Morbid obesity with BMI of 40.0-44.9, adult  (CMD); Orthostatic hypotension; MI, old; Screen for colon cancer; Venous stasis dermatitis; Hearing loss of both ears due to cerumen impaction; Atherosclerotic heart disease of native coronary artery with angina pectoris (CMD); and Statin intolerance on their problem list.    Past surgical history:   Trav has a past surgical history that includes hernia repair (12/17/2021).    Past family history:  His family history includes Hypertension in his mother; Myocardial Infarction in his mother; Rheumatoid Arthritis in his mother; Seizure Disorder in his daughter.    Social history  Trav reports that he has never smoked. He has never used smokeless tobacco. He reports current alcohol use. He reports that he does not use drugs.    Medication List  Trav has a current medication list which includes the following prescription(s): allopurinol, ezetimibe, isosorbide mononitrate, levothyroxine, losartan, metformin, metoprolol succinate, evolocumab, blood glucose monitoring suppl, and aspirin.    Allergies  Trav is allergic to atorvastatin and covid-19 (mrna) vaccine.  Review of Systems  Constitutional: Negative for activity change, appetite change, chills, diaphoresis, fatigue, fever, unexpected weight change.  HENT: Positive for hearing loss. Negative for congestion, dental problem, drooling, ear discharge, ear pain, facial swelling, mouth sores, nose bleeds, postnasal drip, rhinorrhea, sinus pain, sinus  pressure, sneezing, sore throat, tinnitus, trouble swallowing, voice change.   Eyes: Negative for eye discharge, itching, pain, eye redness, photophobia, visual disturbances.  Cardiovascular: Negative for chest pain, leg swelling, palpitations.  Respiratory: Negative for apnea, chest tightness, choking, cough, shortness of breath, stridor, wheezing.  Gastrointestinal: Negative for abdominal distention, abdominal pain, anal bleeding, blood in stool, constipation, diarrhea, nausea, rectal pain, vomiting.  Endocrine: Negative for cold and heat intolerance, polydipsia, polyphagia, polyuria.  Genitourinary: Negative for difficulty urinating, dysuria, enuresis, flank pain, frequency, genital sore, hematuria, penile discharge, penile pain, penile swelling, scrotal swelling, testicular pain, urgency, urine decreased.  Musculoskeletal: Negative for arthralgias, back pain, gait problem, joint swelling, myalgias, neck pain, neck stiffness.  Skin: Negative for color change, pallor, rash, wound.  Allergies: Negative for environmental allergies, food allergies, immunocompromised.  Neurological: Negative for dizziness, facial asymmetry, headaches, light-headedness, numbness, seizures, speech difficulty, syncope, tremors, weakness.  Hematology: Negative for adenopathy, bruises/bleeds easily.  Psychiatric/Behavioral: Negative for agitation, behavior problem, confusion, decreased concentration, dysphoric mood, hallucinations, nervous/anxious, self-injury, sleep disturbances, suicidal ideas.    Objective   Vitals: Visit Vitals  /64 (BP Location: LUE - Left upper extremity)   Pulse 76   Temp 97.9 °F (36.6 °C) (Temporal)   Resp 16   Ht 5' 11\" (1.803 m)   Wt (!) 143.3 kg (316 lb)   SpO2 94%   BMI 44.07 kg/m²       Physical Exam  Constitutional: Patient is obese.Alert, in no acute distress and current vital signs reviewed.   Head and Face: Atraumatic, no deformities, normocephalic, normal facies.  Eyes: No discharge, normal  conjunctiva, no eyelid swelling, no ptosis and the sclerae were normal. Pupils equal, round and reactive to light and accommodation, conjugate gaze and extraocular movements were intact.   ENT: Normal appearing outer ear, normal appearing nose. Examination of the tympanic membrane showed normal landmarks, normal appearing external canal. Nasal mucosa is moist and pink, no nasal discharge. Normal lips. Oral mucosa pink and moist, no oral lesions.   Neck: Normal appearing neck, supple neck and no mass was seen. Thyroid not enlarged and no thyroid nodules.   Lymphatic: No lymphadenopathy.   Pulmonary: No respiratory distress, normal respiratory rate, and effort and no accessory muscle use. Breath sounds are normal. No rales, rhonchi or wheezing heard.  Cardiovascular: Normal rate, no murmurs were heard, regular rhythm, normal S1 and normal S2. Edema was not present in the lower extremities.   Abdomen: Soft, nontender, nondistended, normal bowel sounds and no abdominal mass. No hepatomegaly and no splenomegaly. No umbilical hernia was discovered.   Musculoskeletal: Normal gait. No musculoskeletal erythema was seen, no joint swelling seen, and no joint tenderness was elicited. No scoliosis. Normal range of motion. There was no joint instability noted. Muscle strength and tone were normal.   Neurologic: Cranial nerves grossly intact. Normal DTR’s. No sensory deficits noted. No coordination deficits. Normal gait. Muscle strength and tone were normal.   Psychiatric: Oriented to person, oriented to place and oriented to time. Alert and awake, interactive and mood/affect were appropriate. Judgment not impaired. Normal attention span. Short term memory intact.   Skin, Hair, Nails: Normal skin color and pigmentation and no rash. No foot ulcers and no skin ulcer.    Office Visit on 06/25/2024   Component Date Value Ref Range Status   • Hemoglobin A1C, POC 06/25/2024 6.1 (A)  4.5 - 5.6 % Final       Assessment   1.    2. Screen  for colon cancer    3. Type 2 diabetes mellitus without complication, without long-term current use of insulin (CMD)    4. Benign essential HTN    5. Hyperlipidemia, mixed    6. Atherosclerosis of native coronary artery of native heart with angina pectoris (CMD)    7. Bifascicular bundle branch block    8. Statin intolerance    9. Venous stasis dermatitis of right lower extremity    10. MI, old    11. Acquired hypothyroidism    12. Morbid obesity with BMI of 40.0-44.9, adult (CMD)    13. Hyperuricemia       Plan:        Screen for colon cancer:  Ordered Occult Blood - iFOB (aka FIT); Future.    Type 2 diabetes mellitus without complication, without long-term current use of insulin (CMD):  Reviewed, renewed, and reconciled medications.  Ordered CBC with differential.  Ordered Comprehensive metabolic panel.  Ordered Lipid panel with reflex.  Ordered Thyroid stimulating hormone.  Ordered Glycohemoglobin.  Ordered Microalbumin urine random.  Recommended continuing diet and Metformin 1000 mg bid.  Referral provided to Ophthalmology.  Annual eye exam by ophthalmologist must be done as a part of recommended screening.   Annual Urine- microalbumin, creatinine advised.   Patient instructed to check daily FBS & 2 hr PPBS & keep a log of it to be reviewed by MD, check blood sugar if not feeling well any time & call MD ( Guidelines discussed in detail ) Do not skip meals at any time, family  & work place should be aware of pt's condition & understand the basics of treating hypoglycemia episode. Call 911 in emergency.  Patient was advised of Diagnosis and the importance of seeking prompt follow up as discussed in detail   Patient counseled about the compliance with medications and the regular monitoring of blood sugars. Advised to adhere to the diet protocol prescribed with regular exercise. Other precautions discussed   1. Always test your blood sugar before starting to drive.  2. Always carry a blood glucose meter and always  carry appropriate snacks, including a quick-acting source of sugar ( eg. juice, non diet soda, hard candy, or dextrose tablets)and snacks with complex carbohydrates, fat and protein ( eg, cheese crackers ) in your vehicle.  3. Never begin an extended drive with low normal blood glucose ( eg,70-90 mg / dl ) without prophylactic carbohydrate consumption to avoid a fall in blood glucose during drive.  4. Stop the vehicle as soon as any of the symptoms of low blood glucose are experienced and measure and treat the blood glucose level.  5. Not resume driving until blood glucose and cognition have recovered.  Hypoglycemia management discussed including Instructions on avoiding and responding to hypoglycemia, discussion about vulnerability for driving mishaps, understand when it is safe and unsafe to drive, alcohol intake may increase the risk of hypoglycemia, pt was counseled to test glucose more frequently for several hours and avoid driving after moderate alcohol intake.  Maintain a log of all blood sugar reading and show it to me.    Benign essential HTN:  Reviewed, renewed, and reconciled medications.  Recommended continuing Metoprolol ER and Losartan 50 mg daily.  Patient counseled about the compliance with medications and the regular monitoring of blood pressures. Advised a restricted salt diet with regular exercise, maintain a daily BP log at home & show to me , call / see me earlier as indicated, guidelines discussed in detail. Reducing diastolic BP from 90 to 80 mm Hg in people with diabetes reduces the risk of major cardiovascular events by 50 %.    Hyperlipidemia, mixed:  Reviewed, renewed, and reconciled medications.   Recommended continuing Zetia.  Patient has statin intolerance and unable to tolerate any statins.  Lipid management discussed including diet modification, pharmacologic intervention & compliance, increasing activity level, regular follow up on lipid profile as indicated , risks of hyperlipedemia  including complications discussed as well. Improved control of LDL can reduce cardiovascular complications by 20 % to 50 %.    Atherosclerosis of native coronary artery of native heart with angina pectoris (CMD):  Reviewed, renewed, and reconciled medications.  Ordered Electrocardiogram 12-Lead today, which showed right bundle branch block, left axis deviation, possible old inferior wall MI.  Cannot rule out old anterior infarction.  Compared to previous EKG of 2022 there is no change seen.  Recommended continuing Metoprolol, Aspirin, and Isosorbide.    Bifascicular bundle branch block:  Reviewed, renewed, and reconciled medications.  Recommended continuing Metoprolol, Losartan, Imdur, and Zetia.     Statin intolerance:  Reviewed, renewed, and reconciled medications.   Recommended continuing Zetia.     Venous stasis dermatitis of right lower extremity:  Recommended continuing current management.    MI, old:  Stable.    Acquired hypothyroidism:  Reviewed, renewed, and reconciled medications.   Recommended continuing Levothyroxine 100 MCG daily.    Morbid obesity with BMI of 40.0-44.9, adult (CMD):  Advised to follow diet and exercise.  Advised patient to exercise 30 minutes daily.  Lifestyles changes for successful weight loss discussed in detail. Initial goal is to lose 10 % of TBW by reducing current calorie intake by 500 - 1000 pepper / day, consistent reduction in daily dietary intake is the most successful long term and safest weight loss strategy , exercise is an important part of a comprehensive weight loss program with lifestyle modification. However exercise alone is not adequate for effective weight loss.    Hyperuricemia:  Reviewed, renewed, and reconciled medications.  Recommended continuing Allopurinol.    Followup in 3 months.    Patient reminded for the fecal occult blood test, he will try to do it, but does not promise.    Advised patient to get the blood test done prior to the next visit.    Referral  provided to Ophthalmology, advised to get the diabetic eye exam done soon.    Screening schedule/checklist for next 5-10 years:  Health Maintenance Due   Topic Date Due   • Colorectal Cancer Screen  Never done        A written education, counseling, referral, and plan for obtaining appropriate screening services has been given to patient. See patient instructions.     Refer to orders.  Medical compliance with plan discussed and risks of non-compliance reviewed.  Patient education completed on disease process, etiology & prognosis.  Proper usage and side effects of medications reviewed & discussed.  Patient understands and agrees with the plan.Return to clinic as clinically indicated as discussed with patient who verbalized understanding of the plan and is in agreement with the plan.    DUTCH Wagner, have created a visit summary document based on the audio recording between Dr. Wiliam Weeks MD and this patient for the physician to review, edit as needed, and authenticate.    Creation Date: 2/28/2024     I have reviewed and edited the visit summary above and attest that it is accurate.   Wiliam Weeks MD, FACP

## 2024-10-22 NOTE — PROGRESS NOTES
Diana Kapadia is a 39 year old female.  No chief complaint on file.      HPI:   Diana presents for follow up on weight, mental health, vitamin deficiencies     Updates from last visit:   She was in a good groove prior to going [place] a trip to [place]. She is having a hard time getting back in the groove.     Her daughter was not sleeping.       Body/skin:   Muscle aches and soreness     Mental State:   She does feel more irritable   No official diagnosis of anxiety as a child. She was never evaluated   She feels like lately she needs to watching TV and scrolling  Presents as positive person but a lot of what if thinking   Remembers being very stressed at work and her boss was talking about her in front of her and said \" she never gets stressed\"   She had a therapist tell her that her mind get blown easily - She is more likely to think deeply this will happen in the middle of the night. As a child she would have a hard time sleeping thinking \"if we die and heaven is forever what does forever mean\" She could not understand those thoughts     Fluoxetine - for 2 years   She feels that the anxiety is improved. It is not helping her   Symptoms    Classic ADD (1) Inattentive ADD (2) Overfocused ADD (3) Temporal lobe ADD (4) Limbic ADD  (5) Ring of fire   (6) Anxious ADD (7)   Short attention span (C)  * * * * * *   Easily distracted (C)  * * * * * *   Procrastination (C)  * * * * * *   Disorganized (C)  * * * * * *   Poor follow through(C)  * * * * * *   Poor impulse control (C) * * * * * * *   Inattentive          Hyperactive *  Sometimes  Sometimes  Sometimes  Sometimes     Trouble listening to other talk * Irritated when people are bad listeners        Poor attention to detail *         Careless mistakes  *         forgetful *         Tendency to lose things *         fidget/restless *         Trouble waiting turns *         Acts as though driven by motor *         Noisy *         Talking excessively *      sometimes    Interrupts *         Trouble Focusing           Time Management problems          Excessive daydreaming          Complaints of being bored  *        Appears unmotivated  *        Appears uninterested/apathetic  *   *     Being tired/sluggish/slow moving  Morning sluggish slow to get up and moving, more energy at night    *     Appears spacy or preoccupied    *      Excessive worry   As a child       Getting stuck in neg thought loop          Oppositional/argumentative   *   *     Classic ADD (1) Inattentive ADD (2) Overfocused ADD (3) Temporal lobe ADD (4) Limbi ADD  (5) Ring of fire   (6) Anxious ADD (7)   Compulsive behaviors   *       Trouble identifying options   Decision paralysis        Holds grudges   *       Difficulty shifting attention from one task to the next   maybe       Holds on to opinions and does not listen to others   *       Needs to have things done a certain way   *   *    Memory problems          Auditory processing issues    She cannot usually listen to a podcast and do something else or daydreaming       Irritability     * *    Quick temper    Push it down and then explode      Periods of confusion/spaciness          Periods of panic/fear for no reason    Diag panic disorder - managed with talk and CBT       Visual changes - seeing shadows/obj change shape    fuzziness      Ching - vu          Sensitive/mild paranoia    sensitive      Headaches/abd pain no cause    *   *stress sx   Hx of head injury    *      Dark thoughts    In the past with anxiety but rare  Some intrusive thoughts postpartum       Possible learning disability    *      Social isolation     *  *   Negativity          Feeling/perceived of hopelessness          Feelings of guilt          Sleep changes (too much or little          Chronic low self esteem     Felt like she had a lot of friend but not as athletic/pretty. She was not the leader      Classic ADD (1) Inattentive ADD (2) Overfocused ADD (3) Temporal  lobe ADD (4) Limbic ADD  (5) Ring of fire   (6) Anxious ADD (7)   Sensitive to light/noise/clothes/touch      Sometimes noise   Motorcycle may irritate her  Light to an extent   No clothing     Inflexible rigid thinking      *    Cyclic mood changes  (highs and low)          Periods of mean/nasty or insensitive behavior      Only to her safe people     Unpredictable behavior      *    Impulsivity           Grandiose thinking      Sometimes she has Existential thought that can make her overwhelmed. Looking out over the mountains can make her overwhelmed     Rapid speech      *    Racing thoughts          Anxious or fearful       *   Freeze in social situations       Detach in social situations- dread social situations   -once she is there she is a different person  -masking    Fear of being judged          Conflict avoidant                Hormones -   Stopped nursing in July     GI -   She does not know if the bloating is improved.     Weight -   She is not weighing herself. She feels her clothes are fitting better. She feels stronger.       Lifestyle Factors affecting health:   Diet - She has been focusing on more protein   Protein powder - muscle milk   In the morning she is not wanting breakfast yet. She will have an RX bar or eggs  She feels that she is eating more throughout the day and later.   Nighttime she is still snacking     Lunch -   Bean salad and having that into the week.     Dinner - protein, vegetable healthy carb   She is still craving something. She wants a treat to end the day.       Sleep - sleep has been hard with daughter      HPI's FROM PREVIOUS VISITS      Diana presents for follow up,     Updates from last visit:   Since we spoke last she has been more mindful of protein intake. She does feel a little better. The number on the scale is not showing that.     Thyroid: sub optimal     Mental State:   She is less irritable when she is able to get a work out in before her daughter is up.   She  feels more anxious when she is foggy.     Hormones -   Brain fog very much there.     GI -   She has been paying attention to her bowels. She is not regular. She is having bowels daily. But sometimes it is late others it is early. She has a sense of urgency. She is having soft stools daily .  She is waking up with her chest discomfort and she is snoring more. She is a lot more congested and itchy     Weight - no changes in weight       Lifestyle Factors affecting health:   Diet - Incorporating more protein     Exercise -  She is doing strength training. She goes to a class that is barre, pilates and yoga. She feels exercise helps her achy and more clear headed.      Stress - stress is the same \. She is trying to see if she should go back to work full time. Parenting is feeling a lot  better     Sleep - She is getting more full nights of sleep. She is still waking up super tired.     Supplements:   She got the isopure protein. She has been having protein shake 4 times a week. The probiotic she has not done yet.       HPI's FROM PREVIOUS VISITS      Diana presents for initial evaluation,     Main concern:   She is 9 months post partum. She is feeling like her health could be better. She is struggling to lose weight. She has gotten back to exercise and nothing is coming off.   She is still feeding still.   She is feeling achier     Thyroid: NO known thyroid issues.     Body/rash:   Brain fog around cycle  She has achiness all the time. No achiness before she was pregnant. Yesterday she ran a mile and a half and she did not feel right.     Hormones - She was diagnosed with endometriosis 31 or 32.   She feels like she has brain fog especially around her cycle.   She has a history of endometriosis. They discovered it when she was looking into fertility preservation and found endometrial     Painful periods. She feels this was an issue as long as she can remember    She started her period at age 9   She started birth  control in college. She went off it in her 20s. BC affected her mood.     She proactively went to acupuncture to help with fertility.   No miscarriages     She has regular cycle. It is about once a month it is less than 30 days     GI - She has regular bowel she will have 1 a day. Occasionally throughout the day.   She feels bloating. She feels this is something she has struggled with for a while     Mental state -   She has struggled with anxiety.   She was proactive post partum seeing a therapist.   She is more down the week before her cycle     Weight History:   She has felt like in the past.     Before puberty she was a little chubby she was never heavier.   She did not struggle with weight in high school and college.   She was always size 4-6 high school and college. She was very active.     Late 20s early 30s she felt like that was a concern that she could not lose the weight in her stomach. She had a concern because her grandmother had a tumor that was ovarian cancer     She has only lost her daughters weight     Childhood -   Trauma: no childhood trauma, no other trauma   Antibiotic use  She was on antibiotics for ear infections. She had tubes twice. She started becoming immune to different antibiotics.         Lifestyle Factors affecting health:   Diet - She sometimes feels she is all over the place with eating.   Morning she will wake up and have a RX bar - clean protein bar     Mid morning - toast, egg, spinach    Lunch salad with chicken sometimes cottage cheese. She is eating whole foods throughout the day. She grazes more.     Diner - 530 turkey nachos on peppers   [place] with greens     After her daughter goes to sleep she will snack with peanuts, white chocolate chips and pretzels     Exercise -she has been going to a strength pilates once or twice a week.      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 She is trying to figure out her next career move. She feels her stress has  manifested into anxiety     Sleep - Daughter does not sleep through the night. She will wake with her daughter once a night.   She can fall asleep and stay asleep     Supplements:   Was on a prenatal     ALLERGIES   Allergies[1]     CURRENT MEDICATIONS:     Current Outpatient Medications   Medication Sig Dispense Refill    FLUoxetine 20 MG Oral Cap Take 1 capsule (20 mg total) by mouth daily.      FLUoxetine 10 MG Oral Cap Take 1 capsule (10 mg total) by mouth daily. (Patient not taking: Reported on 7/17/2024)      LORazepam 0.5 MG Oral Tab Take 1 tablet (0.5 mg total) by mouth 2 (two) times daily as needed for Anxiety.         MEDICAL HISTORY:     Past Medical History:    Anxiety    Endometriosis       SURGICAL HISTORY:   History reviewed. No pertinent surgical history.    FAMILY HISTORY:      Family History   Problem Relation Age of Onset    Cancer Father     Cancer Maternal Grandmother        SOCIAL HISTORY:     Social History     Socioeconomic History    Marital status: Single   Tobacco Use    Smoking status: Never    Smokeless tobacco: Never   Vaping Use    Vaping status: Never Used   Substance and Sexual Activity    Alcohol use: Yes    Drug use: Never       REVIEW OF SYSTEMS:   Review of Systems     See HPI for pertinent positives and negatives     PHYSICAL EXAM:   There were no vitals filed for this visit.    Physical Exam         Physical Exam  Constitutional:       Appearance: Normal appearance.   Neurological:      General: No focal deficit present.      Mental Status: She is alert and oriented to person, place, and time.   Psychiatric:         Mood and Affect: Mood normal.    ASSESSMENT AND PLAN:     Plan   1) Supplement to encourage and dopamine   - monitor irritability, focus, concentration   -future consideration wellbutrin, straterra or other stimulant. Need to stabilize jose first   2) Fiber, protein and carbs   3) Try to increase good fats - lunch olive oil, avocado, nuts and seeds   High fiber,  good fat at dinner   - concerned low fat with protein could be making her more irritable   4) magnesium at bedtime - restart     ADHD resources for Women   Radical guide for women with Adhd   [email or web address]/[email or web address]   Dr Carvajal - 7 types ADHD     1. Low vitamin D level    2. Abnormal thyroid blood test  - Vitamin B6; Future  - Vitamin B12 [E]; Future  - Folic Acid Serum (Folate); Future  - Ferritin; Future    3. Weight gain  - Vitamin B6; Future  - Vitamin B12 [E]; Future  - Folic Acid Serum (Folate); Future  - Ferritin; Future    4. Menorrhagia with regular cycle  - Vitamin B6; Future  - Vitamin B12 [E]; Future  - Folic Acid Serum (Folate); Future  - Ferritin; Future    5. Other fatigue  - Vitamin B6; Future  - Vitamin B12 [E]; Future  - Folic Acid Serum (Folate); Future  - Ferritin; Future    6. Micronutrients deficiency  - Vitamin B6; Future  - Vitamin B12 [E]; Future  - Folic Acid Serum (Folate); Future  - Ferritin; Future      Time spent with patient: Over 60 minutes spent in chart review and in direct communication with patient obtaining and reviewing history, creating a unique care plan, explaining the rationale for treatment, reviewing potential SE and overall treatment plan,  documenting all clinical information in Epic. Over 50% of this time was in education, counseling and coordination of care.     This visit was conducted using Telemedicine with live, interactive video and audio.   The patient understands the risks and benefits of Telemedicine and that a Telemedicine visit limits the ability to perform a thorough physical examination which may affect objective findings related to specific symptoms and conditions which can, in turn, affect treatment.      The patient was located in the [facility] at the time of the encounter.       Return for 8-12 weeks .      Problem List Items Addressed This Visit    None  Visit Diagnoses       Low vitamin D level    -  Primary    Abnormal thyroid blood test         Relevant Orders    Vitamin B6    Vitamin B12 [E]    Folic Acid Serum (Folate)    Ferritin    Weight gain        Relevant Orders    Vitamin B6    Vitamin B12 [E]    Folic Acid Serum (Folate)    Ferritin    Menorrhagia with regular cycle        Relevant Orders    Vitamin B6    Vitamin B12 [E]    Folic Acid Serum (Folate)    Ferritin    Other fatigue        Relevant Orders    Vitamin B6    Vitamin B12 [E]    Folic Acid Serum (Folate)    Ferritin    Micronutrients deficiency        Relevant Orders    Vitamin B6    Vitamin B12 [E]    Folic Acid Serum (Folate)    Ferritin             Orders Placed This Visit:  Orders Placed This Encounter   Procedures    Vitamin B6    Vitamin B12 [E]    Folic Acid Serum (Folate)    Ferritin     No orders of the defined types were placed in this encounter.      Patient Instructions   Plan   1) Supplement to encourage and dopamine   - monitor irritability, focus, concentration   -future consideration wellbutrin, strattera, adderall       2 twice a day     Emotion Balance Support (formerly Deproloft-HF)  2) Fiber, protein and carbs   3) Try to increase good fats - lunch olive oil, avocado, nuts and seeds   High fiber, good fat at dinner   4) magnesium at bedtime - restart     ADHD resources for Women   Radical guide for women with Adhd   [email or web address]/adhd-[email or web address]-[email or web address]   Dr Amen - 7 types ADHD     Return for 8-12 weeks .    Patient affirmed understanding of plan and all questions were answered.     Alissa Jeffrey PA-C         [1] No Known Allergies

## 2024-11-25 ENCOUNTER — TELEPHONE (OUTPATIENT)
Dept: FAMILY MEDICINE CLINIC | Facility: CLINIC | Age: 39
End: 2024-11-25

## 2024-11-25 ENCOUNTER — OFFICE VISIT (OUTPATIENT)
Dept: FAMILY MEDICINE CLINIC | Facility: CLINIC | Age: 39
End: 2024-11-25
Payer: COMMERCIAL

## 2024-11-25 VITALS
SYSTOLIC BLOOD PRESSURE: 108 MMHG | HEIGHT: 64 IN | WEIGHT: 196 LBS | BODY MASS INDEX: 33.46 KG/M2 | HEART RATE: 63 BPM | DIASTOLIC BLOOD PRESSURE: 76 MMHG | TEMPERATURE: 98 F | OXYGEN SATURATION: 97 %

## 2024-11-25 DIAGNOSIS — Z23 ENCOUNTER FOR IMMUNIZATION: ICD-10-CM

## 2024-11-25 DIAGNOSIS — F41.1 GENERALIZED ANXIETY DISORDER: ICD-10-CM

## 2024-11-25 DIAGNOSIS — H69.93 DYSFUNCTION OF BOTH EUSTACHIAN TUBES: ICD-10-CM

## 2024-11-25 DIAGNOSIS — Z00.00 ADULT GENERAL MEDICAL EXAMINATION: Primary | ICD-10-CM

## 2024-11-25 DIAGNOSIS — Z91.09 ENVIRONMENTAL ALLERGIES: ICD-10-CM

## 2024-11-25 RX ORDER — ECONAZOLE NITRATE 10 MG/G
1 CREAM TOPICAL 2 TIMES DAILY
COMMUNITY
Start: 2024-08-12 | End: 2024-11-25

## 2024-11-25 NOTE — PATIENT INSTRUCTIONS
Eustachian tube exercises:  1. Yawn often.  2. Chewing motion with jaw 5-10 times per hour.  3. Blow up a balloon.  4. Plug your nose, close your mouth and blow out gently trying to \"pop\" your ears.  -Claritin or Zyrtec 1 tablet by mouth daily x 4 weeks over-the-counter (generic is OK).  -Flonase nasal spray 1 spray in each nostril twice daily x 4 weeks over-the-counter (generic is OK). Think \"nose to toes\" and rinse mouth after each use.

## 2024-11-25 NOTE — TELEPHONE ENCOUNTER
Patient sees gynecologist Dr. Elma Connell with [facility]. Can we please contact that office to get pap smear results to close care gaps? Thanks!

## 2024-11-25 NOTE — PROGRESS NOTES
Chief Complaint:   Chief Complaint   Patient presents with    Physical    Fatigue     Has a 15 month old, feels body aches started 15 months ago    Ear Problem     Sounds muffed, about 9 months     Nose Problem     Nose congestion in the morning       HPI:   This is a 39 year old female coming in for physical. Seeing integrative medicine for chronic fatigue and body aches. Reports she has had morning nasal congestion yet dryness x 8-9 months, along with feeling of ears popping and never fully clearing. She also feels like her chest is congested in the mornings which clears quickly when she wakes up.     Results for orders placed or performed in visit on 06/25/24   Free T3 (Triiodothryronine)    Collection Time: 06/25/24  8:23 AM   Result Value Ref Range    T3 Free 2.91 2.40 - 4.20 pg/mL   Reverse T3, Serum    Collection Time: 06/25/24  8:23 AM   Result Value Ref Range    Reverse T3 8.6 (L) 9.2 - 24.1 ng/dL   Thyroid Antithyroglobulin AB    Collection Time: 06/25/24  8:23 AM   Result Value Ref Range    Anti-Thyroglobulin <15 <60 U/mL   Thyroid Peroxidase (TPO) AB    Collection Time: 06/25/24  8:23 AM   Result Value Ref Range    Anti-Thyroperoxidase <28 <60 U/mL   Free T4, (Free Thyroxine)    Collection Time: 06/25/24  8:23 AM   Result Value Ref Range    Free T4 0.8 0.8 - 1.7 ng/dL   Assay, Thyroid Stim Hormone    Collection Time: 06/25/24  8:23 AM   Result Value Ref Range    TSH 0.973 0.550 - 4.780 mIU/mL   Comp Metabolic Panel (14)    Collection Time: 06/25/24  8:23 AM   Result Value Ref Range    Glucose 104 (H) 70 - 99 mg/dL    Sodium 139 136 - 145 mmol/L    Potassium 4.3 3.5 - 5.1 mmol/L    Chloride 111 98 - 112 mmol/L    CO2 20.0 (L) 21.0 - 32.0 mmol/L    Anion Gap 8 0 - 18 mmol/L    BUN 14 9 - 23 mg/dL    Creatinine 0.61 0.55 - 1.02 mg/dL    BUN/CREA Ratio 23.0 (H) 10.0 - 20.0    Calcium, Total 9.1 8.7 - 10.4 mg/dL    Calculated Osmolality 289 275 - 295 mOsm/kg    eGFR-Cr 117 >=60 mL/min/1.73m2    ALT 19 10 - 49  U/L    AST 17 <=34 U/L    Alkaline Phosphatase 95 37 - 98 U/L    Bilirubin, Total 0.3 0.3 - 1.2 mg/dL    Total Protein 6.9 5.7 - 8.2 g/dL    Albumin 4.2 3.2 - 4.8 g/dL    Globulin  2.7 2.0 - 3.5 g/dL    A/G Ratio 1.6 1.0 - 2.0    Patient Fasting for CMP? Yes    Dehydroepiandrosterone Sulfate    Collection Time: 06/25/24  8:23 AM   Result Value Ref Range    DHEA Sulfate 73.0 25.9 - 460.2 ug/dL   Estradiol    Collection Time: 06/25/24  8:23 AM   Result Value Ref Range    Estradiol 100.3 No established range for female sex pg/mL   Estrone, Serum    Collection Time: 06/25/24  8:23 AM   Result Value Ref Range    Estrone 56 27 - 231 pg/mL   FSH    Collection Time: 06/25/24  8:23 AM   Result Value Ref Range    FSH 3.9 No established range for female sex mIU/mL   LH Fertility    Collection Time: 06/25/24  8:23 AM   Result Value Ref Range    LH 5.3 No established range for female sex mIU/mL   Testosterone,Total and Weakly Bound w/ SHBG    Collection Time: 06/25/24  8:23 AM   Result Value Ref Range    Testosterone Tot LC/MS 11.4 10.0 - 55.0 ng/dL    Testost % Free+Weak Bnd 13.7 3.0 - 18.0 %    Testost Free+Weak Bnd 1.6 0.0 - 9.5 ng/dL    Sex Horm Bind Glob 39.7 24.6 - 122.0 nmol/L   Progesterone    Collection Time: 06/25/24  8:23 AM   Result Value Ref Range    Progesterone 13.18 No established range for female sex ng/mL   Insulin    Collection Time: 06/25/24  8:23 AM   Result Value Ref Range    Insulin 7.4 3.0 - 25.0 mU/L   Leptin, Serum    Collection Time: 06/25/24  8:23 AM   Result Value Ref Range    Leptin 56.1 ng/mL   Vitamin D, 25-Hydroxy    Collection Time: 06/25/24  8:23 AM   Result Value Ref Range    Vitamin D, 25OH, Total 26.6 (L) 30.0 - 100.0 ng/mL   CBC W/ DIFFERENTIAL    Collection Time: 06/25/24  8:23 AM   Result Value Ref Range    WBC 7.5 4.0 - 11.0 x10(3) uL    RBC 4.76 3.80 - 5.30 x10(6)uL    HGB 13.7 12.0 - 16.0 g/dL    HCT 40.1 35.0 - 48.0 %    MCV 84.2 80.0 - 100.0 fL    MCH 28.8 26.0 - 34.0 pg    MCHC 34.2  31.0 - 37.0 g/dL    RDW-SD 40.6 35.1 - 46.3 fL    RDW 13.2 11.0 - 15.0 %    .0 150.0 - 450.0 10(3)uL    Neutrophil Absolute Prelim 4.32 1.50 - 7.70 x10 (3) uL    Neutrophil Absolute 4.32 1.50 - 7.70 x10(3) uL    Lymphocyte Absolute 2.48 1.00 - 4.00 x10(3) uL    Monocyte Absolute 0.46 0.10 - 1.00 x10(3) uL    Eosinophil Absolute 0.13 0.00 - 0.70 x10(3) uL    Basophil Absolute 0.07 0.00 - 0.20 x10(3) uL    Immature Granulocyte Absolute 0.02 0.00 - 1.00 x10(3) uL    Neutrophil % 57.8 %    Lymphocyte % 33.2 %    Monocyte % 6.1 %    Eosinophil % 1.7 %    Basophil % 0.9 %    Immature Granulocyte % 0.3 %             Past Medical History:    Anxiety    Endometriosis     History reviewed. No pertinent surgical history.  Social History:  Social History     Socioeconomic History    Marital status: Single   Tobacco Use    Smoking status: Never     Passive exposure: Never    Smokeless tobacco: Never   Vaping Use    Vaping status: Never Used   Substance and Sexual Activity    Alcohol use: Yes    Drug use: Never   Other Topics Concern    Caffeine Concern No    Exercise Yes    Seat Belt No    Special Diet No    Stress Concern Yes    Weight Concern Yes     Family History:  Family History   Problem Relation Age of Onset    Cancer Father     Cancer Maternal Grandmother      Allergies:  Allergies[1]  Current Meds:  Current Outpatient Medications   Medication Sig Dispense Refill    LORazepam 0.5 MG Oral Tab Take 1 tablet (0.5 mg total) by mouth 2 (two) times daily as needed for Anxiety.      FLUoxetine 20 MG Oral Cap Take 1 capsule (20 mg total) by mouth daily.        Counseling given: Not Answered       REVIEW OF SYSTEMS:   CONSTITUTIONAL:  Denies unusual weight gain/loss, fever, chills, or fatigue.  HEENT:  See HPI.  INTEGUMENTARY:  Denies rashes, itching, skin lesion.  CARDIOVASCULAR:  Denies chest pain, palpitations, edema, dyspnea on exertion or at rest.  RESPIRATORY:  Denies shortness of breath, wheezing, cough or  sputum.  GASTROINTESTINAL:  Denies abdominal pain, nausea, vomiting, constipation, diarrhea, or blood in stool.  GENITOURINARY: Denies dysuria, hematuria, frequency.  MUSCULOSKELETAL:  Denies weakness, muscle aches, back pain, joint pain, swelling or stiffness.  NEUROLOGICAL:  Denies headache, seizures, dizziness.  PSYCHIATRIC:  Denies depression. +Anxiety, was taking fluoxetine but off now due to insurance issues, seeing psychiatry and planning to restart. Denies suicidal thoughts.    EXAM:   /76 (BP Location: Right arm, Patient Position: Sitting, Cuff Size: adult)   Pulse 63   Temp 98.2 °F (36.8 °C) (Oral)   Ht 5' 4\" (1.626 m)   Wt 196 lb (88.9 kg)   LMP 11/11/2024 (Exact Date)   SpO2 97%   BMI 33.64 kg/m²  Estimated body mass index is 33.64 kg/m² as calculated from the following:    Height as of this encounter: 5' 4\" (1.626 m).    Weight as of this encounter: 196 lb (88.9 kg).   Vital signs reviewed.Appears stated age, well groomed, in no acute distress.  Physical Exam:  GEN:  Patient is alert, awake and oriented, well developed, well nourished.  HEENT:  Head:  Normocephalic, atraumatic Eyes: EOMI, PERRLA, conjunctivae clear bilaterally, no eye discharge Ears: External normal, TM clear but retracted bilaterally. Nose: patent, no nasal discharge, turbinates pale. Throat:  No tonsillar erythema or exudate.  Mouth:  No oral lesions, good dentition.  NECK: Supple, no CLAD, no thyromegaly.  SKIN: No rashes, no skin lesion, no bruising, good turgor.  HEART:  Regular rate and rhythm, no murmurs, rubs or gallops.  LUNGS: Clear to auscultation bilterally, no rales/rhonchi/wheezing.  ABDOMEN:  Soft, nondistended, nontender, bowel sounds normal in all 4 quadrants, no masses, no hepatosplenomegaly.  BACK: No tenderness to palpation, FROM.  EXTREMITIES:  No edema, no cyanosis, no clubbing, FROM, 2+ dorsalis pedis pulses bilaterally.  NEURO:  No deficit, normal gait, strength and tone, sensory intact, normal  reflexes.    ASSESSMENT AND PLAN:   1. Adult general medical examination  -Healthy diet and regular exercise.  -Annual eye exam and biannual dental visits.    2. Dysfunction of both eustachian tubes  -Recommended Claritin/Zyrtec daily x 4 weeks, Flonase BID x 4 weeks, eustachian tube exercises.  -If no improvement would refer to ENT.   - ENT Referral - [place] ([facility])    3. Environmental allergies  -See above.    4. Generalized anxiety disorder  -Followed by psychiatry.    5. Encounter for immunization  -Patient thinks she may have received flu vaccine at her child's pediatrician's office. She will check, and if not can schedule RN visit. Will update us.      Meds & Refills for this Visit:  Requested Prescriptions      No prescriptions requested or ordered in this encounter         Problem List:  There is no problem list on file for this patient.      Roopa Oliva, APRN  11/25/2024  5:55 PM         [1] No Known Allergies

## 2025-04-22 ENCOUNTER — OFFICE VISIT (OUTPATIENT)
Facility: CLINIC | Age: 40
End: 2025-04-22
Payer: COMMERCIAL

## 2025-04-22 ENCOUNTER — LAB ENCOUNTER (OUTPATIENT)
Dept: LAB | Facility: REFERENCE LAB | Age: 40
End: 2025-04-22
Attending: FAMILY MEDICINE
Payer: COMMERCIAL

## 2025-04-22 VITALS
TEMPERATURE: 98 F | HEIGHT: 64 IN | HEART RATE: 78 BPM | DIASTOLIC BLOOD PRESSURE: 78 MMHG | RESPIRATION RATE: 18 BRPM | OXYGEN SATURATION: 98 % | WEIGHT: 192 LBS | SYSTOLIC BLOOD PRESSURE: 118 MMHG | BODY MASS INDEX: 32.78 KG/M2

## 2025-04-22 DIAGNOSIS — R55 SYNCOPE, UNSPECIFIED SYNCOPE TYPE: ICD-10-CM

## 2025-04-22 DIAGNOSIS — Z00.00 ENCOUNTER FOR GENERAL ADULT MEDICAL EXAMINATION W/O ABNORMAL FINDINGS: ICD-10-CM

## 2025-04-22 DIAGNOSIS — R00.2 PALPITATIONS: ICD-10-CM

## 2025-04-22 DIAGNOSIS — H69.93 DYSFUNCTION OF BOTH EUSTACHIAN TUBES: ICD-10-CM

## 2025-04-22 DIAGNOSIS — Z00.00 ENCOUNTER FOR GENERAL ADULT MEDICAL EXAMINATION W/O ABNORMAL FINDINGS: Primary | ICD-10-CM

## 2025-04-22 LAB
ALBUMIN SERPL-MCNC: 4.5 G/DL (ref 3.2–4.8)
ALBUMIN/GLOB SERPL: 1.7 {RATIO} (ref 1–2)
ALP LIVER SERPL-CCNC: 82 U/L (ref 37–98)
ALT SERPL-CCNC: 26 U/L (ref 10–49)
ANION GAP SERPL CALC-SCNC: 6 MMOL/L (ref 0–18)
AST SERPL-CCNC: 23 U/L (ref ?–34)
BASOPHILS # BLD AUTO: 0.09 X10(3) UL (ref 0–0.2)
BASOPHILS NFR BLD AUTO: 1.2 %
BILIRUB SERPL-MCNC: 0.3 MG/DL (ref 0.3–1.2)
BUN BLD-MCNC: 9 MG/DL (ref 9–23)
BUN/CREAT SERPL: 10.8 (ref 10–20)
CALCIUM BLD-MCNC: 9.6 MG/DL (ref 8.7–10.4)
CHLORIDE SERPL-SCNC: 107 MMOL/L (ref 98–112)
CHOLEST SERPL-MCNC: 185 MG/DL (ref ?–200)
CO2 SERPL-SCNC: 29 MMOL/L (ref 21–32)
CREAT BLD-MCNC: 0.83 MG/DL (ref 0.55–1.02)
DEPRECATED RDW RBC AUTO: 41.1 FL (ref 35.1–46.3)
EGFRCR SERPLBLD CKD-EPI 2021: 92 ML/MIN/1.73M2 (ref 60–?)
EOSINOPHIL # BLD AUTO: 0.1 X10(3) UL (ref 0–0.7)
EOSINOPHIL NFR BLD AUTO: 1.3 %
ERYTHROCYTE [DISTWIDTH] IN BLOOD BY AUTOMATED COUNT: 13.2 % (ref 11–15)
EST. AVERAGE GLUCOSE BLD GHB EST-MCNC: 103 MG/DL (ref 68–126)
FASTING PATIENT LIPID ANSWER: NO
FASTING STATUS PATIENT QL REPORTED: NO
GLOBULIN PLAS-MCNC: 2.6 G/DL (ref 2–3.5)
GLUCOSE BLD-MCNC: 71 MG/DL (ref 70–99)
HBA1C MFR BLD: 5.2 % (ref ?–5.7)
HCT VFR BLD AUTO: 42.5 % (ref 35–48)
HDLC SERPL-MCNC: 62 MG/DL (ref 40–59)
HGB BLD-MCNC: 14 G/DL (ref 12–16)
IMM GRANULOCYTES # BLD AUTO: 0.02 X10(3) UL (ref 0–1)
IMM GRANULOCYTES NFR BLD: 0.3 %
LDLC SERPL CALC-MCNC: 104 MG/DL (ref ?–100)
LYMPHOCYTES # BLD AUTO: 2.63 X10(3) UL (ref 1–4)
LYMPHOCYTES NFR BLD AUTO: 34.5 %
MCH RBC QN AUTO: 28.2 PG (ref 26–34)
MCHC RBC AUTO-ENTMCNC: 32.9 G/DL (ref 31–37)
MCV RBC AUTO: 85.7 FL (ref 80–100)
MONOCYTES # BLD AUTO: 0.49 X10(3) UL (ref 0.1–1)
MONOCYTES NFR BLD AUTO: 6.4 %
NEUTROPHILS # BLD AUTO: 4.3 X10 (3) UL (ref 1.5–7.7)
NEUTROPHILS # BLD AUTO: 4.3 X10(3) UL (ref 1.5–7.7)
NEUTROPHILS NFR BLD AUTO: 56.3 %
NONHDLC SERPL-MCNC: 123 MG/DL (ref ?–130)
OSMOLALITY SERPL CALC.SUM OF ELEC: 291 MOSM/KG (ref 275–295)
PLATELET # BLD AUTO: 300 10(3)UL (ref 150–450)
POTASSIUM SERPL-SCNC: 4.5 MMOL/L (ref 3.5–5.1)
PROT SERPL-MCNC: 7.1 G/DL (ref 5.7–8.2)
RBC # BLD AUTO: 4.96 X10(6)UL (ref 3.8–5.3)
SODIUM SERPL-SCNC: 142 MMOL/L (ref 136–145)
TRIGL SERPL-MCNC: 104 MG/DL (ref 30–149)
TSI SER-ACNC: 0.83 UIU/ML (ref 0.55–4.78)
VLDLC SERPL CALC-MCNC: 17 MG/DL (ref 0–30)
WBC # BLD AUTO: 7.6 X10(3) UL (ref 4–11)

## 2025-04-22 PROCEDURE — 36415 COLL VENOUS BLD VENIPUNCTURE: CPT | Performed by: FAMILY MEDICINE

## 2025-04-22 PROCEDURE — 83036 HEMOGLOBIN GLYCOSYLATED A1C: CPT | Performed by: FAMILY MEDICINE

## 2025-04-22 PROCEDURE — 80053 COMPREHEN METABOLIC PANEL: CPT

## 2025-04-22 PROCEDURE — 99203 OFFICE O/P NEW LOW 30 MIN: CPT | Performed by: FAMILY MEDICINE

## 2025-04-22 PROCEDURE — 80061 LIPID PANEL: CPT | Performed by: FAMILY MEDICINE

## 2025-04-22 PROCEDURE — 84443 ASSAY THYROID STIM HORMONE: CPT | Performed by: FAMILY MEDICINE

## 2025-04-22 PROCEDURE — 99385 PREV VISIT NEW AGE 18-39: CPT | Performed by: FAMILY MEDICINE

## 2025-04-22 PROCEDURE — 85025 COMPLETE CBC W/AUTO DIFF WBC: CPT | Performed by: FAMILY MEDICINE

## 2025-04-22 NOTE — PROGRESS NOTES
Subjective:   Diana Kapadia is a 39 year old female who presents for Establish Care (Pt would like to establish care), Fainting (Pt would like to discuss fainting spell on 3/16/25), and Ear Problem (Pt reports ears clogged, itchy, feels chest congestion/pain as well. Pt unsure if symptoms are related, takes claritin for some ease of symptoms.)     Patient presents to establish care. Patient had fainting episode on 3/16/25. Was a very busy day for patient. That evening went to bed at 10 p.m. and woke up at 12 a.m. to urinate. Felt palpitations and \"panic.\" Patient urinated. Thinks stood up and started washing hands. Then woke up on the bathroom floor. Fall was not witnessed. Patient had already regained consciousness and was walking back to bedroom when  came and found her. Patient did not lose control of bowel or bladder. No tongue biting. No recent medication changes around this time. Patient had the fainted the year prior. Did go to the ER. Workup normal at that time. Thought to be due to nursing at the time. No proceeding vertigo. Patient does endorse some lightheadedness if switches positions quickly. Patient does endorse occasional heart flutter daily.      Patient does wake up daily with chest congestion and pain in the morning that subsides throughout the course of the day. Patient also has history of ET tubes. Does continue to have itchy ears and clogged sensation. Will use claritin and flonase which helps \"pop\" ears but never fully clear. Last saw and ENT during childhood.     Last pap 3 weeks ago with [facility].     History/Other:    Chief Complaint Reviewed and Verified  Nursing Notes Reviewed and   Verified  Tobacco Reviewed  Allergies Reviewed  Medications Reviewed    Problem List Reviewed  Medical History Reviewed  Surgical History   Reviewed  OB Status Reviewed  Family History Reviewed  Social History   Reviewed         Tobacco:  She has never smoked tobacco.    Current  Medications[1]      Review of Systems:  Review of Systems   Constitutional: Negative.    HENT: Negative.          +clogged sensation in ears   Eyes: Negative.    Respiratory: Negative.     Cardiovascular: Negative.    Gastrointestinal: Negative.    Genitourinary: Negative.    Musculoskeletal: Negative.    Skin: Negative.    Neurological:  Positive for syncope and light-headedness.   Psychiatric/Behavioral: Negative.           Objective:   /78 (BP Location: Right arm, Patient Position: Sitting, Cuff Size: adult)   Pulse 78   Temp 97.8 °F (36.6 °C) (Temporal)   Resp 18   Ht 5' 4\" (1.626 m)   Wt 192 lb (87.1 kg)   LMP 04/13/2025 (Exact Date)   SpO2 98%   BMI 32.96 kg/m²  Estimated body mass index is 32.96 kg/m² as calculated from the following:    Height as of this encounter: 5' 4\" (1.626 m).    Weight as of this encounter: 192 lb (87.1 kg).  Physical Exam  Vitals and nursing note reviewed.   Constitutional:       General: She is not in acute distress.     Appearance: Normal appearance. She is not ill-appearing.   HENT:      Head: Normocephalic and atraumatic.      Right Ear: Tympanic membrane normal. There is no impacted cerumen.      Left Ear: Tympanic membrane normal. There is no impacted cerumen.      Mouth/Throat:      Mouth: Mucous membranes are moist.      Pharynx: Oropharynx is clear. No oropharyngeal exudate or posterior oropharyngeal erythema.   Eyes:      General:         Right eye: No discharge.         Left eye: No discharge.      Extraocular Movements: Extraocular movements intact.      Pupils: Pupils are equal, round, and reactive to light.   Cardiovascular:      Rate and Rhythm: Normal rate and regular rhythm.      Heart sounds: Normal heart sounds. No murmur heard.     No friction rub. No gallop.   Pulmonary:      Effort: Pulmonary effort is normal.      Breath sounds: Normal breath sounds. No wheezing, rhonchi or rales.   Abdominal:      General: Abdomen is flat. Bowel sounds are normal.  There is no distension.      Palpations: Abdomen is soft. There is no mass.      Tenderness: There is no abdominal tenderness. There is no guarding or rebound.   Musculoskeletal:         General: Normal range of motion.      Cervical back: Normal range of motion.      Right lower leg: No edema.      Left lower leg: No edema.   Skin:     General: Skin is warm and dry.      Findings: No rash.   Neurological:      General: No focal deficit present.      Mental Status: She is alert. Mental status is at baseline.   Psychiatric:         Mood and Affect: Mood normal.         Behavior: Behavior normal.         Assessment & Plan:   1. Encounter for general adult medical examination w/o abnormal findings (Primary)  -     Hemoglobin A1C  -     Lipid Panel  -     TSH W Reflex To Free T4  -     CBC With Differential With Platelet  -     Comp Metabolic Panel (14); Future; Expected date: 04/22/2025    Ordered annual labs today.  Pap smear up-to-date.  Patient signed [facility] for records  Patient vaccines up-to-date.  Patient signed [facility] for Tdap vaccine records    2. Palpitations  3. Syncope, unspecified syncope type  -     TSH W Reflex To Free T4  -     CBC With Differential With Platelet  -     Comp Metabolic Panel (14); Future; Expected date: 04/22/2025  -     EKG 12 Lead; Future; Expected date: 04/22/2025  -     CARD ZIO EXTENDED MONITOR 3-7 DAYS (CPT=93242/18914); Future; Expected date: 04/22/2025    Ordered labs as listed, EKG and Holter monitor to begin workup.  Pending results may refer to cardiology for further workup.  Did  patient that suspicion is highest for potential vasovagal syncope    4. Dysfunction of both eustachian tubes  -     ENT Referral - In Network    Referred to ENT for further evaluation and workup.  Counseled patient to continue daily Flonase and Claritin while awaiting to see ENT      Return in about 1 year (around 4/22/2026), or if symptoms worsen or fail to improve.    Imelda Huitron MD, 4/22/2025,  8:49 AM          [1]   Current Outpatient Medications   Medication Sig Dispense Refill    FLUoxetine 20 MG Oral Cap Take 1 capsule (20 mg total) by mouth daily.      LORazepam 0.5 MG Oral Tab Take 1 tablet (0.5 mg total) by mouth 2 (two) times daily as needed for Anxiety.

## 2025-04-25 ENCOUNTER — OFFICE VISIT (OUTPATIENT)
Dept: OTOLARYNGOLOGY | Facility: CLINIC | Age: 40
End: 2025-04-25

## 2025-04-25 DIAGNOSIS — H69.93 DYSFUNCTION OF BOTH EUSTACHIAN TUBES: Primary | ICD-10-CM

## 2025-04-25 DIAGNOSIS — K21.9 GASTROESOPHAGEAL REFLUX DISEASE, UNSPECIFIED WHETHER ESOPHAGITIS PRESENT: ICD-10-CM

## 2025-04-25 PROCEDURE — 99203 OFFICE O/P NEW LOW 30 MIN: CPT | Performed by: SPECIALIST

## 2025-04-25 RX ORDER — FAMOTIDINE 40 MG/1
40 TABLET, FILM COATED ORAL DAILY
Qty: 30 TABLET | Refills: 3 | Status: SHIPPED | OUTPATIENT
Start: 2025-04-25

## 2025-04-26 NOTE — PROGRESS NOTES
Diana Kapadia is a 39 year old female.   Chief Complaint   Patient presents with    Ear Problem     Patient is here due to muffled and pressure in ears. X 2 years.      HPI:   Here she feels like her ears are blocked for the past 2 years.  She has had a history of spontaneous perforation and PE tubes.    Current Medications[1]   Past Medical History[2]   Social History:  Short Social Hx on File[3]     REVIEW OF SYSTEMS:   GENERAL HEALTH: feels well otherwise  GENERAL : denies fever, chills, sweats, weight loss, weight gain  SKIN: denies any unusual skin lesions or rashes  RESPIRATORY: denies shortness of breath with exertion  NEURO: denies headaches    EXAM:   LMP 04/13/2025 (Exact Date)   System Details   Skin Inspection - Normal.   Constitutional Overall appearance - Normal.   Head/Face Facial features - Normal. Eyebrows - Normal. Skull - Normal.   Eyes Conjunctiva - Right: Normal, Left: Normal. Pupil - Right: Normal, Left: Normal.    Ears Inspection - Right: Normal, Left: Normal.   Canal - Right: Normal, Left: Normal.   TM -hyper flaccid tympanic membrane's.  No middle ear fluid seen on the date of the visit.   Nasal External nose - Normal.   Nasal septum - Normal.  Turbinates - Normal.   Oral/Oropharynx Lips - Normal, Tonsils - Normal, Tongue - Normal    Neck Exam Inspection - Normal. Palpation - Normal. Parotid gland - Normal. Thyroid gland - Normal.   Lymph Detail Submental. Submandibular. Anterior cervical. Posterior cervical. Supraclavicular all without enlargement   Nasopharynx No adenoid pad by mirror examination   Larynx Posterior laryngeal erythema.  No retained secretions.  No tumors or masses seen.  Normal vocal cord mobility.   Psychiatric Orientation - Oriented to time, place, person & situation. Appropriate mood and affect.   Neurological Memory - Normal. Cranial nerves - Cranial nerves II through XII grossly intact.     ASSESSMENT AND PLAN:   1. Dysfunction of both eustachian  tubes  Audiogram and tympanograms ordered to better evaluate underlying hearing.  Patient to try Claritin.  - Audiology Referral - [facility])    2. Gastroesophageal reflux disease, unspecified whether esophagitis present  Patient placed on antireflux diet and trial of famotidine.      The patient indicates understanding of these issues and agrees to the plan.      Nicole Vizcarra MD  4/25/2025  9:35 PM       [1]   Current Outpatient Medications   Medication Sig Dispense Refill    famotidine 40 MG Oral Tab Take 1 tablet (40 mg total) by mouth daily. 30 tablet 3    FLUoxetine 20 MG Oral Cap Take 1 capsule (20 mg total) by mouth daily.      LORazepam 0.5 MG Oral Tab Take 1 tablet (0.5 mg total) by mouth 2 (two) times daily as needed for Anxiety.     [2]   Past Medical History:   Anxiety    Endometriosis   [3]   Social History  Socioeconomic History    Marital status: Single   Tobacco Use    Smoking status: Never     Passive exposure: Never    Smokeless tobacco: Never   Vaping Use    Vaping status: Never Used   Substance and Sexual Activity    Alcohol use: Yes    Drug use: Never   Other Topics Concern    Caffeine Concern No    Exercise Yes    Seat Belt No    Special Diet No    Stress Concern Yes    Weight Concern Yes

## 2025-04-26 NOTE — PATIENT INSTRUCTIONS
I ordered an audiogram to check your underlying hearing status.  We did discuss the fact that your eustachian tubes are likely not functioning well.  There are areas of the tympanic membrane that are hypermobile.  I placed you on an antireflux diet and trial of famotidine for your reflux disease.  No greasy foods, spicy foods, chocolate, caffeine, alcohol, spearmint, peppermint, lemon, lime, orange, grapefruit, tomatoes.  Don't eat 2 hours before bedtime  Elevate the head of bed